# Patient Record
Sex: MALE | Race: BLACK OR AFRICAN AMERICAN | NOT HISPANIC OR LATINO | Employment: OTHER | URBAN - METROPOLITAN AREA
[De-identification: names, ages, dates, MRNs, and addresses within clinical notes are randomized per-mention and may not be internally consistent; named-entity substitution may affect disease eponyms.]

---

## 2017-04-06 PROBLEM — E78.00 HYPERCHOLESTEREMIA: Chronic | Status: ACTIVE | Noted: 2017-04-06

## 2017-04-06 PROBLEM — I10 ESSENTIAL HYPERTENSION: Chronic | Status: ACTIVE | Noted: 2017-04-06

## 2018-01-10 PROBLEM — Z12.11 COLON CANCER SCREENING: Status: ACTIVE | Noted: 2018-01-10

## 2018-01-10 PROBLEM — Z12.11 COLON CANCER SCREENING: Status: RESOLVED | Noted: 2018-01-10 | Resolved: 2018-01-10

## 2018-03-14 PROBLEM — K76.0 HEPATIC STEATOSIS: Status: ACTIVE | Noted: 2018-03-14

## 2018-06-28 PROBLEM — Z98.890 S/P COLONOSCOPY: Chronic | Status: ACTIVE | Noted: 2018-06-28

## 2018-07-10 ENCOUNTER — TELEPHONE (OUTPATIENT)
Dept: ORTHOPEDICS | Facility: CLINIC | Age: 68
End: 2018-07-10

## 2018-07-10 DIAGNOSIS — G89.29 CHRONIC PAIN OF LEFT KNEE: Primary | ICD-10-CM

## 2018-07-10 DIAGNOSIS — M25.562 CHRONIC PAIN OF LEFT KNEE: Primary | ICD-10-CM

## 2018-07-10 NOTE — TELEPHONE ENCOUNTER
Informed patient he needs to complete the ordered xrays prior to his appointment patient verbalized understanding and will have those completed.

## 2018-07-10 NOTE — TELEPHONE ENCOUNTER
Left Voic[facility] to return call. Xray orders are in patient needs to obtain these prior to appointment.

## 2018-07-19 ENCOUNTER — OFFICE VISIT (OUTPATIENT)
Dept: ORTHOPEDICS | Facility: CLINIC | Age: 68
End: 2018-07-19
Payer: MEDICARE

## 2018-07-19 VITALS
DIASTOLIC BLOOD PRESSURE: 88 MMHG | WEIGHT: 254.13 LBS | SYSTOLIC BLOOD PRESSURE: 132 MMHG | HEIGHT: 72 IN | BODY MASS INDEX: 34.42 KG/M2 | HEART RATE: 63 BPM

## 2018-07-19 DIAGNOSIS — M17.12 PRIMARY OSTEOARTHRITIS OF LEFT KNEE: Primary | ICD-10-CM

## 2018-07-19 PROCEDURE — 99999 PR PBB SHADOW E&M-EST. PATIENT-LVL III: CPT | Mod: PBBFAC,,, | Performed by: ORTHOPAEDIC SURGERY

## 2018-07-19 PROCEDURE — 99204 OFFICE O/P NEW MOD 45 MIN: CPT | Mod: 25,S$PBB,, | Performed by: ORTHOPAEDIC SURGERY

## 2018-07-19 PROCEDURE — 20610 DRAIN/INJ JOINT/BURSA W/O US: CPT | Mod: S$PBB,LT,, | Performed by: ORTHOPAEDIC SURGERY

## 2018-07-19 PROCEDURE — 99213 OFFICE O/P EST LOW 20 MIN: CPT | Mod: PBBFAC,PN | Performed by: ORTHOPAEDIC SURGERY

## 2018-07-19 PROCEDURE — 20610 DRAIN/INJ JOINT/BURSA W/O US: CPT | Mod: PBBFAC,PN | Performed by: ORTHOPAEDIC SURGERY

## 2018-07-19 RX ORDER — TRIAMCINOLONE ACETONIDE 40 MG/ML
40 INJECTION, SUSPENSION INTRA-ARTICULAR; INTRAMUSCULAR
Status: DISCONTINUED | OUTPATIENT
Start: 2018-07-19 | End: 2023-08-14

## 2018-07-19 RX ORDER — MELOXICAM 15 MG/1
15 TABLET ORAL DAILY
Qty: 30 TABLET | Refills: 2 | Status: SHIPPED | OUTPATIENT
Start: 2018-07-19 | End: 2018-08-31

## 2018-07-19 NOTE — PROGRESS NOTES
Subjective:      Patient ID: Ignacio Pelaez is a 68 y.o. male.    Chief Complaint: Swelling and Pain of the Left Knee    Ignacio Pelaez is a 68 year old male here with a 5 years history of left knee pain. The patient is a  retiree. There was not a history of trauma.  The pain is moderate The pain is located in the medial aspect of the knee. There is is radiation.  The pain radaites to the anterior lower leg.  There is not catching or locking.   The pain is described as achy. The patient has not had prior surgery. It is aggravated by sitting, standing, walking, upon awakening and with moderate activity.  It is not alleviated by rest. There is not numbness or tingling of the lower extremity.  There is not back pain.  He  has not tried medications or injections.  He does have difficulty getting in or out of a car, getting dressed, or going up or down stairs.  The patient does not use an assistive device.          Review of Systems   Constitution: Negative for chills and fever.   Cardiovascular: Negative for chest pain and syncope.   Respiratory: Negative for cough and shortness of breath.    Gastrointestinal: Negative for nausea and vomiting.   Neurological: Negative for brief paralysis and seizures.   Psychiatric/Behavioral: Negative for altered mental status and hallucinations.         Objective:            General    Constitutional: He is oriented to person, place, and time. He appears well-developed and well-nourished.   HENT:   Head: Normocephalic and atraumatic.   Eyes: Conjunctivae are normal.   Neck: Normal range of motion.   Cardiovascular: Intact distal pulses.    Pulmonary/Chest: Effort normal.   Neurological: He is alert and oriented to person, place, and time.   Psychiatric: He has a normal mood and affect. His behavior is normal. Judgment and thought content normal.     General Musculoskeletal Exam   Gait: normal and antalgic         Left Knee Exam     Inspection   Erythema: absent  Scars: absent  Swelling:  absent  Effusion: present  Deformity: deformity  Bruising: absent    Tenderness   The patient tender to palpation of the no tenderness and medial joint line.    Range of Motion   Extension: 5   Flexion: 120     Tests   Meniscus   Nico:  Medial - positive Lateral - negative  Stability Lachman: normal (-1 to 2mm) PCL-Posterior Drawer: normal (0 to 2mm)  MCL - Valgus: normal (0 to 2mm)  LCL - Varus: normal (0 to 2mm)  Patella   Patellar Grind: positive    Other   Sensation: normal    Muscle Strength   Left Lower Extremity   Hip Abduction: 5/5   Quadriceps:  5/5   Hamstrin/5     Vascular Exam       Left Pulses  Dorsalis Pedis:      2+  Posterior Tibial:      2+        Narrative     EXAMINATION:  XR KNEE AP LAT WITH SUNRISE LEFT    CLINICAL HISTORY:  Pain in left knee    TECHNIQUE:  Three views, weight bearing    COMPARISON:  Left knee x-ray 2014    FINDINGS:  There is severe narrowing of the medial joint space with calcifications noted about the medial joint edge.  There is no joint effusion.  The patella appears unremarkable.                 Assessment:       Encounter Diagnosis   Name Primary?    Primary osteoarthritis of left knee Yes          Plan:       Ignacio was seen today for swelling and pain.    Diagnoses and all orders for this visit:    Primary osteoarthritis of left knee  -     meloxicam (MOBIC) 15 MG tablet; Take 1 tablet (15 mg total) by mouth once daily. Do not take with any other NSAIDs  Take with a meal      67yo M with LEFT knee OA    Inject LEFT knee  NSAIDs  Activity as tolerated  RTC in 1 month    After obtaining verbal consent from the patient, the injection site was thoroughly prepped.  Ethyl chloride was administered to the skin and the needle was introduced into the LEFT knee.  The injection solution (40mg of Triamcinolone and 0.25% bupivicaine) was placed without complication into the expected location.  The injection site was dressed with a standard band-aid.  The  patient tolerated the injection without issue.

## 2018-08-16 ENCOUNTER — OFFICE VISIT (OUTPATIENT)
Dept: ORTHOPEDICS | Facility: CLINIC | Age: 68
End: 2018-08-16
Payer: MEDICARE

## 2018-08-16 VITALS
HEART RATE: 62 BPM | SYSTOLIC BLOOD PRESSURE: 127 MMHG | BODY MASS INDEX: 34.07 KG/M2 | DIASTOLIC BLOOD PRESSURE: 84 MMHG | WEIGHT: 251.19 LBS

## 2018-08-16 DIAGNOSIS — G89.29 CHRONIC PAIN OF LEFT KNEE: Primary | ICD-10-CM

## 2018-08-16 DIAGNOSIS — M25.562 CHRONIC PAIN OF LEFT KNEE: Primary | ICD-10-CM

## 2018-08-16 PROCEDURE — 99214 OFFICE O/P EST MOD 30 MIN: CPT | Mod: S$PBB,,, | Performed by: ORTHOPAEDIC SURGERY

## 2018-08-16 PROCEDURE — 99213 OFFICE O/P EST LOW 20 MIN: CPT | Mod: PBBFAC,PN | Performed by: ORTHOPAEDIC SURGERY

## 2018-08-16 PROCEDURE — 99999 PR PBB SHADOW E&M-EST. PATIENT-LVL III: CPT | Mod: PBBFAC,,, | Performed by: ORTHOPAEDIC SURGERY

## 2018-08-16 NOTE — PROGRESS NOTES
Subjective:      Patient ID: Ignacio Pelaez is a 68 y.o. male.    Chief Complaint: Pain and Follow-up of the Left Knee    Ignacio Pelaez is a 68 year old male here with a 5 years history of left knee pain. The patient is a  retiree. There was not a history of trauma.  The pain is moderate The pain is located in the medial aspect of the knee. There is is radiation.  The pain radaites to the anterior lower leg.  There is not catching or locking.   The pain is described as achy. The patient has not had prior surgery. It is aggravated by sitting, standing, walking, upon awakening and with moderate activity.  It is not alleviated by rest. There is not numbness or tingling of the lower extremity.  There is not back pain.  He  has not tried medications or injections.  He does have difficulty getting in or out of a car, getting dressed, or going up or down stairs.  The patient does not use an assistive device.  Returns today with good relief after injection      Pain   Pertinent negatives include no chest pain, chills, coughing, fever, nausea or vomiting.       Review of Systems   Constitution: Negative for chills and fever.   Cardiovascular: Negative for chest pain and syncope.   Respiratory: Negative for cough and shortness of breath.    Gastrointestinal: Negative for nausea and vomiting.   Neurological: Negative for brief paralysis and seizures.   Psychiatric/Behavioral: Negative for altered mental status and hallucinations.         Objective:            General    Constitutional: He is oriented to person, place, and time. He appears well-developed and well-nourished.   HENT:   Head: Normocephalic and atraumatic.   Eyes: Conjunctivae are normal.   Neck: Normal range of motion.   Cardiovascular: Intact distal pulses.    Pulmonary/Chest: Effort normal.   Neurological: He is alert and oriented to person, place, and time.   Psychiatric: He has a normal mood and affect. His behavior is normal. Judgment and thought content  normal.     General Musculoskeletal Exam   Gait: normal and antalgic         Left Knee Exam     Inspection   Erythema: absent  Scars: absent  Swelling: absent  Effusion: present  Deformity: deformity  Bruising: absent    Tenderness   The patient tender to palpation of the no tenderness and medial joint line.    Range of Motion   Extension: 5   Flexion: 120     Tests   Meniscus   Nico:  Medial - positive Lateral - negative  Stability Lachman: normal (-1 to 2mm) PCL-Posterior Drawer: normal (0 to 2mm)  MCL - Valgus: normal (0 to 2mm)  LCL - Varus: normal (0 to 2mm)  Patella   Patellar Grind: positive    Other   Sensation: normal    Muscle Strength   Left Lower Extremity   Hip Abduction: 5/5   Quadriceps:  5/5   Hamstrin/5     Vascular Exam       Left Pulses  Dorsalis Pedis:      2+  Posterior Tibial:      2+        Narrative     EXAMINATION:  XR KNEE AP LAT WITH SUNRISE LEFT    CLINICAL HISTORY:  Pain in left knee    TECHNIQUE:  Three views, weight bearing    COMPARISON:  Left knee x-ray 2014    FINDINGS:  There is severe narrowing of the medial joint space with calcifications noted about the medial joint edge.  There is no joint effusion.  The patella appears unremarkable.                 Assessment:       Encounter Diagnosis   Name Primary?    Chronic pain of left knee Yes          Plan:       Ignacio was seen today for pain and follow-up.    Diagnoses and all orders for this visit:    Chronic pain of left knee      69yo M with LEFT knee OA    NSAIDs  Activity as tolerated  RTC in 2 month

## 2018-10-25 ENCOUNTER — OFFICE VISIT (OUTPATIENT)
Dept: ORTHOPEDICS | Facility: CLINIC | Age: 68
End: 2018-10-25
Payer: MEDICARE

## 2018-10-25 VITALS
DIASTOLIC BLOOD PRESSURE: 83 MMHG | WEIGHT: 250.69 LBS | HEIGHT: 73 IN | BODY MASS INDEX: 33.22 KG/M2 | HEART RATE: 71 BPM | SYSTOLIC BLOOD PRESSURE: 117 MMHG

## 2018-10-25 DIAGNOSIS — M17.12 PRIMARY OSTEOARTHRITIS OF LEFT KNEE: Primary | ICD-10-CM

## 2018-10-25 PROCEDURE — 99214 OFFICE O/P EST MOD 30 MIN: CPT | Mod: S$PBB,25,, | Performed by: ORTHOPAEDIC SURGERY

## 2018-10-25 PROCEDURE — 20610 DRAIN/INJ JOINT/BURSA W/O US: CPT | Mod: PBBFAC,PN | Performed by: ORTHOPAEDIC SURGERY

## 2018-10-25 PROCEDURE — 99999 PR PBB SHADOW E&M-EST. PATIENT-LVL III: CPT | Mod: PBBFAC,,, | Performed by: ORTHOPAEDIC SURGERY

## 2018-10-25 PROCEDURE — 99213 OFFICE O/P EST LOW 20 MIN: CPT | Mod: PBBFAC,PN | Performed by: ORTHOPAEDIC SURGERY

## 2018-10-25 RX ORDER — TRIAMCINOLONE ACETONIDE 40 MG/ML
40 INJECTION, SUSPENSION INTRA-ARTICULAR; INTRAMUSCULAR
Status: DISCONTINUED | OUTPATIENT
Start: 2018-10-25 | End: 2018-10-25 | Stop reason: HOSPADM

## 2018-10-25 RX ADMIN — TRIAMCINOLONE ACETONIDE 40 MG: 40 INJECTION, SUSPENSION INTRA-ARTICULAR; INTRAMUSCULAR at 03:10

## 2018-10-25 NOTE — PROCEDURES
Large Joint Aspiration/Injection: L knee  Date/Time: 10/25/2018 3:51 PM  Performed by: Luis Miguel Chi MD  Authorized by: Luis Miguel Chi MD     Consent Done?:  Yes (Verbal)  Indications:  Pain  Procedure site marked: Yes    Timeout: Prior to procedure the correct patient, procedure, and site was verified      Location:  Knee  Site:  L knee  Prep: Patient was prepped and draped in usual sterile fashion    Ultrasonic Guidance for needle placement: No  Needle size:  21 G  Approach:  Anterolateral  Medications:  40 mg triamcinolone acetonide 40 mg/mL  Patient tolerance:  Patient tolerated the procedure well with no immediate complications

## 2018-10-25 NOTE — PROGRESS NOTES
Subjective:      Patient ID: Ignacio Pelaez Jr. is a 68 y.o. male.    Chief Complaint: Follow-up (2 month f/u)    Ignacio Pelaez is a 68 year old male here with a 5 years history of left knee pain. The patient is a  retiree. There was not a history of trauma.  The pain is moderate The pain is located in the medial aspect of the knee. There is is radiation.  The pain radaites to the anterior lower leg.  There is not catching or locking.   The pain is described as achy. The patient has not had prior surgery. It is aggravated by sitting, standing, walking, upon awakening and with moderate activity.  It is not alleviated by rest. There is not numbness or tingling of the lower extremity.  There is not back pain.  He  has not tried medications or injections.  He does have difficulty getting in or out of a car, getting dressed, or going up or down stairs.  The patient does not use an assistive device.          Review of Systems   Constitution: Negative for chills and fever.   Cardiovascular: Negative for chest pain and syncope.   Respiratory: Negative for cough and shortness of breath.    Gastrointestinal: Negative for nausea and vomiting.   Neurological: Negative for brief paralysis and seizures.   Psychiatric/Behavioral: Negative for altered mental status and hallucinations.         Objective:            General    Constitutional: He is oriented to person, place, and time. He appears well-developed and well-nourished.   HENT:   Head: Normocephalic and atraumatic.   Eyes: Conjunctivae are normal.   Neck: Normal range of motion.   Cardiovascular: Intact distal pulses.    Pulmonary/Chest: Effort normal.   Neurological: He is alert and oriented to person, place, and time.   Psychiatric: He has a normal mood and affect. His behavior is normal. Judgment and thought content normal.     General Musculoskeletal Exam   Gait: normal and antalgic         Left Knee Exam     Inspection   Erythema: absent  Scars: absent  Swelling:  absent  Effusion: present  Deformity: absent  Bruising: absent    Tenderness   The patient tender to palpation of the no tenderness and medial joint line.    Range of Motion   Extension: 5   Flexion: 120     Tests   Meniscus   Nico:  Medial - positive Lateral - negative  Stability Lachman: normal (-1 to 2mm) PCL-Posterior Drawer: normal (0 to 2mm)  MCL - Valgus: normal (0 to 2mm)  LCL - Varus: normal (0 to 2mm)  Patella   Patellar Grind: positive    Other   Sensation: normal    Muscle Strength   Left Lower Extremity   Hip Abduction: 5/5   Quadriceps:  5/5   Hamstrin/5     Vascular Exam       Left Pulses  Dorsalis Pedis:      2+  Posterior Tibial:      2+        Narrative     EXAMINATION:  XR KNEE AP LAT WITH SUNRISE LEFT    CLINICAL HISTORY:  Pain in left knee    TECHNIQUE:  Three views, weight bearing    COMPARISON:  Left knee x-ray 2014    FINDINGS:  There is severe narrowing of the medial joint space with calcifications noted about the medial joint edge.  There is no joint effusion.  The patella appears unremarkable.                 Assessment:       Encounter Diagnosis   Name Primary?    Primary osteoarthritis of left knee Yes          Plan:       Ignacio was seen today for follow-up.    Diagnoses and all orders for this visit:    Primary osteoarthritis of left knee  -     Large Joint Aspiration/Injection: L knee      67yo M with LEFT knee OA    Inject LEFT knee  NSAIDs  Activity as tolerated  RTC in 3 months

## 2018-12-13 PROBLEM — M20.10 HALLUX VALGUS: Status: ACTIVE | Noted: 2018-12-13

## 2018-12-13 PROBLEM — B35.1 ONYCHOMYCOSIS: Status: ACTIVE | Noted: 2018-12-13

## 2018-12-13 PROBLEM — M10.9 GOUT: Status: ACTIVE | Noted: 2018-12-13

## 2019-01-08 ENCOUNTER — TELEPHONE (OUTPATIENT)
Dept: ORTHOPEDICS | Facility: CLINIC | Age: 69
End: 2019-01-08

## 2019-01-24 ENCOUNTER — OFFICE VISIT (OUTPATIENT)
Dept: ORTHOPEDICS | Facility: CLINIC | Age: 69
End: 2019-01-24
Payer: MEDICARE

## 2019-01-24 VITALS
DIASTOLIC BLOOD PRESSURE: 97 MMHG | HEART RATE: 75 BPM | WEIGHT: 252.19 LBS | BODY MASS INDEX: 33.42 KG/M2 | HEIGHT: 73 IN | SYSTOLIC BLOOD PRESSURE: 153 MMHG

## 2019-01-24 DIAGNOSIS — M25.562 CHRONIC PAIN OF LEFT KNEE: Primary | ICD-10-CM

## 2019-01-24 DIAGNOSIS — G89.29 CHRONIC PAIN OF LEFT KNEE: Primary | ICD-10-CM

## 2019-01-24 PROCEDURE — 99999 PR PBB SHADOW E&M-EST. PATIENT-LVL III: CPT | Mod: PBBFAC,,, | Performed by: ORTHOPAEDIC SURGERY

## 2019-01-24 PROCEDURE — 99214 PR OFFICE/OUTPT VISIT, EST, LEVL IV, 30-39 MIN: ICD-10-PCS | Mod: S$PBB,,, | Performed by: ORTHOPAEDIC SURGERY

## 2019-01-24 PROCEDURE — 99214 OFFICE O/P EST MOD 30 MIN: CPT | Mod: S$PBB,,, | Performed by: ORTHOPAEDIC SURGERY

## 2019-01-24 PROCEDURE — 99213 OFFICE O/P EST LOW 20 MIN: CPT | Mod: PBBFAC,PN | Performed by: ORTHOPAEDIC SURGERY

## 2019-01-24 PROCEDURE — 99999 PR PBB SHADOW E&M-EST. PATIENT-LVL III: ICD-10-PCS | Mod: PBBFAC,,, | Performed by: ORTHOPAEDIC SURGERY

## 2019-01-24 NOTE — PROGRESS NOTES
Subjective:      Patient ID: Ignacio Pelaez Jr. is a 68 y.o. male.    Chief Complaint: Pain of the Left Knee    Ignacio Pelaez is a 68 year old male here with a 5 years history of left knee pain. The patient is a  retiree. There was not a history of trauma.  The pain is moderate The pain is located in the medial aspect of the knee. There is is radiation.  The pain radaites to the anterior lower leg.  There is not catching or locking.   The pain is described as achy. The patient has not had prior surgery. It is aggravated by sitting, standing, walking, upon awakening and with moderate activity.  It is not alleviated by rest. There is not numbness or tingling of the lower extremity.  There is not back pain.  He  has not tried medications or injections.  He does have difficulty getting in or out of a car, getting dressed, or going up or down stairs.  The patient does not use an assistive device.    Returns today with excellent relief since injection.        Pain   Pertinent negatives include no chest pain, chills, coughing, fever, nausea or vomiting.       Review of Systems   Constitution: Negative for chills and fever.   Cardiovascular: Negative for chest pain and syncope.   Respiratory: Negative for cough and shortness of breath.    Gastrointestinal: Negative for nausea and vomiting.   Neurological: Negative for brief paralysis and seizures.   Psychiatric/Behavioral: Negative for altered mental status and hallucinations.         Objective:            General    Constitutional: He is oriented to person, place, and time. He appears well-developed and well-nourished.   HENT:   Head: Normocephalic and atraumatic.   Eyes: Conjunctivae are normal.   Neck: Normal range of motion.   Cardiovascular: Intact distal pulses.    Pulmonary/Chest: Effort normal.   Neurological: He is alert and oriented to person, place, and time.   Psychiatric: He has a normal mood and affect. His behavior is normal. Judgment and thought content  normal.     General Musculoskeletal Exam   Gait: normal and antalgic         Left Knee Exam     Inspection   Erythema: absent  Scars: absent  Swelling: absent  Effusion: present  Deformity: absent  Bruising: absent    Tenderness   The patient tender to palpation of the no tenderness and medial joint line.    Range of Motion   Extension: 5   Flexion: 120     Tests   Meniscus   Nico:  Medial - positive Lateral - negative  Stability Lachman: normal (-1 to 2mm) PCL-Posterior Drawer: normal (0 to 2mm)  MCL - Valgus: normal (0 to 2mm)  LCL - Varus: normal (0 to 2mm)  Patella   Patellar Grind: positive    Other   Sensation: normal    Muscle Strength   Left Lower Extremity   Hip Abduction: 5/5   Quadriceps:  5/5   Hamstrin/5     Vascular Exam       Left Pulses  Dorsalis Pedis:      2+  Posterior Tibial:      2+        Narrative     EXAMINATION:  XR KNEE AP LAT WITH SUNRISE LEFT    CLINICAL HISTORY:  Pain in left knee    TECHNIQUE:  Three views, weight bearing    COMPARISON:  Left knee x-ray 2014    FINDINGS:  There is severe narrowing of the medial joint space with calcifications noted about the medial joint edge.  There is no joint effusion.  The patella appears unremarkable.                 Assessment:       Encounter Diagnosis   Name Primary?    Chronic pain of left knee Yes          Plan:       Ignacio was seen today for pain.    Diagnoses and all orders for this visit:    Chronic pain of left knee      67yo M with LEFT knee OA    COntinue current regimen  RTC 3 months

## 2019-03-25 PROBLEM — R10.84 ABDOMINAL PAIN, GENERALIZED: Status: ACTIVE | Noted: 2019-03-25

## 2019-04-25 ENCOUNTER — OFFICE VISIT (OUTPATIENT)
Dept: ORTHOPEDICS | Facility: CLINIC | Age: 69
End: 2019-04-25
Payer: MEDICARE

## 2019-04-25 VITALS
WEIGHT: 251.75 LBS | SYSTOLIC BLOOD PRESSURE: 149 MMHG | HEART RATE: 61 BPM | BODY MASS INDEX: 33.22 KG/M2 | DIASTOLIC BLOOD PRESSURE: 99 MMHG

## 2019-04-25 DIAGNOSIS — M17.12 PRIMARY OSTEOARTHRITIS OF LEFT KNEE: Primary | ICD-10-CM

## 2019-04-25 PROCEDURE — 20610 LARGE JOINT ASPIRATION/INJECTION: L KNEE: ICD-10-PCS | Mod: S$PBB,LT,, | Performed by: ORTHOPAEDIC SURGERY

## 2019-04-25 PROCEDURE — 99999 PR PBB SHADOW E&M-EST. PATIENT-LVL III: ICD-10-PCS | Mod: PBBFAC,,, | Performed by: ORTHOPAEDIC SURGERY

## 2019-04-25 PROCEDURE — 99214 OFFICE O/P EST MOD 30 MIN: CPT | Mod: S$PBB,25,, | Performed by: ORTHOPAEDIC SURGERY

## 2019-04-25 PROCEDURE — 99213 OFFICE O/P EST LOW 20 MIN: CPT | Mod: PBBFAC,PN,25 | Performed by: ORTHOPAEDIC SURGERY

## 2019-04-25 PROCEDURE — 99999 PR PBB SHADOW E&M-EST. PATIENT-LVL III: CPT | Mod: PBBFAC,,, | Performed by: ORTHOPAEDIC SURGERY

## 2019-04-25 PROCEDURE — 20610 DRAIN/INJ JOINT/BURSA W/O US: CPT | Mod: PBBFAC,PN | Performed by: ORTHOPAEDIC SURGERY

## 2019-04-25 PROCEDURE — 99214 PR OFFICE/OUTPT VISIT, EST, LEVL IV, 30-39 MIN: ICD-10-PCS | Mod: S$PBB,25,, | Performed by: ORTHOPAEDIC SURGERY

## 2019-04-25 RX ORDER — TRIAMCINOLONE ACETONIDE 40 MG/ML
40 INJECTION, SUSPENSION INTRA-ARTICULAR; INTRAMUSCULAR
Status: DISCONTINUED | OUTPATIENT
Start: 2019-04-25 | End: 2019-04-25 | Stop reason: HOSPADM

## 2019-04-25 RX ORDER — DICYCLOMINE HYDROCHLORIDE 20 MG/1
20 TABLET ORAL DAILY
Refills: 1 | COMMUNITY
Start: 2019-03-19 | End: 2021-05-19

## 2019-04-25 RX ADMIN — TRIAMCINOLONE ACETONIDE 40 MG: 40 INJECTION, SUSPENSION INTRA-ARTICULAR; INTRAMUSCULAR at 04:04

## 2019-04-25 NOTE — PROCEDURES
Large Joint Aspiration/Injection: L knee  Date/Time: 4/25/2019 4:01 PM  Performed by: Luis Miguel Chi MD  Authorized by: Luis Miguel Chi MD     Consent Done?:  Yes (Verbal)  Indications:  Pain  Procedure site marked: Yes    Timeout: Prior to procedure the correct patient, procedure, and site was verified      Location:  Knee  Site:  L knee  Prep: Patient was prepped and draped in usual sterile fashion    Ultrasonic Guidance for needle placement: No  Needle size:  21 G  Approach:  Anterolateral  Medications:  40 mg triamcinolone acetonide 40 mg/mL; 40 mg triamcinolone acetonide 40 mg/mL  Patient tolerance:  Patient tolerated the procedure well with no immediate complications

## 2019-04-25 NOTE — PROGRESS NOTES
Subjective:      Patient ID: Ignacio Pelaez Jr. is a 68 y.o. male.    Chief Complaint: Pain of the Left Knee and Follow-up    Ignacio Pelaez is a 68 year old male here with a 5 years history of left knee pain. The patient is a  retiree. There was not a history of trauma.  The pain is moderate The pain is located in the medial aspect of the knee. There is is radiation.  The pain radaites to the anterior lower leg.  There is not catching or locking.   The pain is described as achy. The patient has not had prior surgery. It is aggravated by sitting, standing, walking, upon awakening and with moderate activity.  It is not alleviated by rest. There is not numbness or tingling of the lower extremity.  There is not back pain.  He  has not tried medications or injections.  He does have difficulty getting in or out of a car, getting dressed, or going up or down stairs.  The patient does not use an assistive device.    Returns today with excellent relief since injection.      Pain   Pertinent negatives include no chest pain, chills, coughing, fever, nausea or vomiting.       Review of Systems   Constitution: Negative for chills and fever.   Cardiovascular: Negative for chest pain and syncope.   Respiratory: Negative for cough and shortness of breath.    Gastrointestinal: Negative for nausea and vomiting.   Neurological: Negative for brief paralysis and seizures.   Psychiatric/Behavioral: Negative for altered mental status and hallucinations.         Objective:            General    Constitutional: He is oriented to person, place, and time. He appears well-developed and well-nourished.   HENT:   Head: Normocephalic and atraumatic.   Eyes: Conjunctivae are normal.   Neck: Normal range of motion.   Cardiovascular: Intact distal pulses.    Pulmonary/Chest: Effort normal.   Neurological: He is alert and oriented to person, place, and time.   Psychiatric: He has a normal mood and affect. His behavior is normal. Judgment and thought  content normal.     General Musculoskeletal Exam   Gait: normal and antalgic         Left Knee Exam     Inspection   Erythema: absent  Scars: absent  Swelling: absent  Effusion: present  Deformity: absent  Bruising: absent    Tenderness   The patient tender to palpation of the no tenderness and medial joint line.    Range of Motion   Extension: 5   Flexion: 120     Tests   Meniscus   Nico:  Medial - positive Lateral - negative  Stability Lachman: normal (-1 to 2mm) PCL-Posterior Drawer: normal (0 to 2mm)  MCL - Valgus: normal (0 to 2mm)  LCL - Varus: normal (0 to 2mm)  Patella   Patellar Grind: positive    Other   Sensation: normal    Muscle Strength   Left Lower Extremity   Hip Abduction: 5/5   Quadriceps:  5/5   Hamstrin/5     Vascular Exam       Left Pulses  Dorsalis Pedis:      2+  Posterior Tibial:      2+          Radiographs of the Left knee demonstrate no fracture disclocation.  There is moderate/severe tricompartmental degeneration with varus deformity.              Assessment:       Encounter Diagnosis   Name Primary?    Primary osteoarthritis of left knee Yes          Plan:       Ignacio was seen today for follow-up and pain.    Diagnoses and all orders for this visit:    Primary osteoarthritis of left knee      67yo M with LEFT knee OA    Inject LEFT knee  NSAIDs  RTC 3 months

## 2019-07-08 PROBLEM — M47.812 SPONDYLOSIS OF CERVICAL REGION WITHOUT MYELOPATHY OR RADICULOPATHY: Status: ACTIVE | Noted: 2019-07-08

## 2019-08-15 ENCOUNTER — OFFICE VISIT (OUTPATIENT)
Dept: ORTHOPEDICS | Facility: CLINIC | Age: 69
End: 2019-08-15
Payer: MEDICARE

## 2019-08-15 VITALS — DIASTOLIC BLOOD PRESSURE: 80 MMHG | SYSTOLIC BLOOD PRESSURE: 110 MMHG | HEART RATE: 78 BPM

## 2019-08-15 DIAGNOSIS — G89.29 CHRONIC PAIN OF LEFT KNEE: Primary | ICD-10-CM

## 2019-08-15 DIAGNOSIS — M25.562 CHRONIC PAIN OF LEFT KNEE: Primary | ICD-10-CM

## 2019-08-15 PROCEDURE — 20610 DRAIN/INJ JOINT/BURSA W/O US: CPT | Mod: PBBFAC,PN | Performed by: ORTHOPAEDIC SURGERY

## 2019-08-15 PROCEDURE — 99213 OFFICE O/P EST LOW 20 MIN: CPT | Mod: PBBFAC,PN | Performed by: ORTHOPAEDIC SURGERY

## 2019-08-15 PROCEDURE — 99999 PR PBB SHADOW E&M-EST. PATIENT-LVL III: CPT | Mod: PBBFAC,,, | Performed by: ORTHOPAEDIC SURGERY

## 2019-08-15 PROCEDURE — 20610 LARGE JOINT ASPIRATION/INJECTION: L KNEE: ICD-10-PCS | Mod: S$PBB,LT,, | Performed by: ORTHOPAEDIC SURGERY

## 2019-08-15 PROCEDURE — 99999 PR PBB SHADOW E&M-EST. PATIENT-LVL III: ICD-10-PCS | Mod: PBBFAC,,, | Performed by: ORTHOPAEDIC SURGERY

## 2019-08-15 PROCEDURE — 99214 OFFICE O/P EST MOD 30 MIN: CPT | Mod: S$PBB,25,, | Performed by: ORTHOPAEDIC SURGERY

## 2019-08-15 PROCEDURE — 99214 PR OFFICE/OUTPT VISIT, EST, LEVL IV, 30-39 MIN: ICD-10-PCS | Mod: S$PBB,25,, | Performed by: ORTHOPAEDIC SURGERY

## 2019-08-15 RX ORDER — TRIAMCINOLONE ACETONIDE 40 MG/ML
40 INJECTION, SUSPENSION INTRA-ARTICULAR; INTRAMUSCULAR
Status: DISCONTINUED | OUTPATIENT
Start: 2019-08-15 | End: 2019-08-15 | Stop reason: HOSPADM

## 2019-08-15 RX ADMIN — TRIAMCINOLONE ACETONIDE 40 MG: 40 INJECTION, SUSPENSION INTRA-ARTICULAR; INTRAMUSCULAR at 02:08

## 2019-08-15 NOTE — PROCEDURES
Large Joint Aspiration/Injection: L knee  Date/Time: 8/15/2019 2:04 PM  Performed by: Luis Miguel Chi MD  Authorized by: Luis Miguel Chi MD     Consent Done?:  Yes (Verbal)  Indications:  Pain  Procedure site marked: Yes    Timeout: Prior to procedure the correct patient, procedure, and site was verified    Anesthesia  Local anesthesia used  Anesthetic: topical anesthetic    Location:  Knee  Site:  L knee  Prep: Patient was prepped and draped in usual sterile fashion    Needle size:  21 G  Ultrasonic Guidance for needle placement: No  Approach:  Anterolateral  Medications:  40 mg triamcinolone acetonide 40 mg/mL  Patient tolerance:  Patient tolerated the procedure well with no immediate complications

## 2019-08-15 NOTE — PROGRESS NOTES
Subjective:      Patient ID: Ignacio Pelaez Jr. is a 69 y.o. male.    Chief Complaint: Pain of the Left Knee and Follow-up    Ignacio Pelaez is a 68 year old male here with a 5 years history of left knee pain. The patient is a  retiree. There was not a history of trauma.  The pain is moderate The pain is located in the medial aspect of the knee. There is is radiation.  The pain radaites to the anterior lower leg.  There is not catching or locking.   The pain is described as achy. The patient has not had prior surgery. It is aggravated by sitting, standing, walking, upon awakening and with moderate activity.  It is not alleviated by rest. There is not numbness or tingling of the lower extremity.  There is not back pain.  He  has not tried medications or injections.  He does have difficulty getting in or out of a car, getting dressed, or going up or down stairs.  The patient does not use an assistive device.    Returns today with excellent relief since injection.  Desires reinjection    Pain   Pertinent negatives include no chest pain, chills, coughing, fever, nausea or vomiting.       Review of Systems   Constitution: Negative for chills and fever.   Cardiovascular: Negative for chest pain and syncope.   Respiratory: Negative for cough and shortness of breath.    Gastrointestinal: Negative for nausea and vomiting.   Neurological: Negative for brief paralysis and seizures.   Psychiatric/Behavioral: Negative for altered mental status and hallucinations.         Objective:            General    Constitutional: He is oriented to person, place, and time. He appears well-developed and well-nourished.   HENT:   Head: Normocephalic and atraumatic.   Eyes: Conjunctivae are normal.   Neck: Normal range of motion.   Cardiovascular: Intact distal pulses.    Pulmonary/Chest: Effort normal.   Neurological: He is alert and oriented to person, place, and time.   Psychiatric: He has a normal mood and affect. His behavior is normal.  Judgment and thought content normal.     General Musculoskeletal Exam   Gait: normal and antalgic         Left Knee Exam     Inspection   Erythema: absent  Scars: absent  Swelling: absent  Effusion: present  Deformity: absent  Bruising: absent    Tenderness   The patient tender to palpation of the no tenderness and medial joint line.    Range of Motion   Extension: 5   Flexion: 120     Tests   Meniscus   Nico:  Medial - positive Lateral - negative  Stability Lachman: normal (-1 to 2mm) PCL-Posterior Drawer: normal (0 to 2mm)  MCL - Valgus: normal (0 to 2mm)  LCL - Varus: normal (0 to 2mm)  Patella   Patellar Grind: positive    Other   Sensation: normal    Muscle Strength   Left Lower Extremity   Hip Abduction: 5/5   Quadriceps:  5/5   Hamstrin/5     Vascular Exam       Left Pulses  Dorsalis Pedis:      2+  Posterior Tibial:      2+          Radiographs of the Left knee demonstrate no fracture disclocation.  There is moderate/severe tricompartmental degeneration with varus deformity.              Assessment:       Encounter Diagnosis   Name Primary?    Chronic pain of left knee Yes          Plan:       Ignacio was seen today for follow-up and pain.    Diagnoses and all orders for this visit:    Chronic pain of left knee  -     Large Joint Aspiration/Injection: L knee      69yo M with LEFT knee OA    Inject LEFT knee  NSAIDs  RTC 3 months

## 2019-11-20 ENCOUNTER — TELEPHONE (OUTPATIENT)
Dept: ORTHOPEDICS | Facility: CLINIC | Age: 69
End: 2019-11-20

## 2019-11-20 NOTE — TELEPHONE ENCOUNTER
Called pt to confirm appointment for 11/21/2019. Pt verbalized understanding and confirmed appointment. No further information discussed.

## 2019-11-21 ENCOUNTER — OFFICE VISIT (OUTPATIENT)
Dept: ORTHOPEDICS | Facility: CLINIC | Age: 69
End: 2019-11-21
Payer: MEDICARE

## 2019-11-21 VITALS — HEIGHT: 73 IN | WEIGHT: 246.94 LBS | BODY MASS INDEX: 32.73 KG/M2

## 2019-11-21 DIAGNOSIS — M17.12 PRIMARY OSTEOARTHRITIS OF LEFT KNEE: Primary | ICD-10-CM

## 2019-11-21 PROCEDURE — 99999 PR PBB SHADOW E&M-EST. PATIENT-LVL III: CPT | Mod: PBBFAC,,, | Performed by: ORTHOPAEDIC SURGERY

## 2019-11-21 PROCEDURE — 99214 PR OFFICE/OUTPT VISIT, EST, LEVL IV, 30-39 MIN: ICD-10-PCS | Mod: S$PBB,25,, | Performed by: ORTHOPAEDIC SURGERY

## 2019-11-21 PROCEDURE — 99999 PR PBB SHADOW E&M-EST. PATIENT-LVL III: ICD-10-PCS | Mod: PBBFAC,,, | Performed by: ORTHOPAEDIC SURGERY

## 2019-11-21 PROCEDURE — 20610 DRAIN/INJ JOINT/BURSA W/O US: CPT | Mod: PBBFAC,PN | Performed by: ORTHOPAEDIC SURGERY

## 2019-11-21 PROCEDURE — 1159F MED LIST DOCD IN RCRD: CPT | Mod: ,,, | Performed by: ORTHOPAEDIC SURGERY

## 2019-11-21 PROCEDURE — 99213 OFFICE O/P EST LOW 20 MIN: CPT | Mod: PBBFAC,PN | Performed by: ORTHOPAEDIC SURGERY

## 2019-11-21 PROCEDURE — 20610 LARGE JOINT ASPIRATION/INJECTION: L KNEE: ICD-10-PCS | Mod: S$PBB,LT,, | Performed by: ORTHOPAEDIC SURGERY

## 2019-11-21 PROCEDURE — 1159F PR MEDICATION LIST DOCUMENTED IN MEDICAL RECORD: ICD-10-PCS | Mod: ,,, | Performed by: ORTHOPAEDIC SURGERY

## 2019-11-21 PROCEDURE — 99214 OFFICE O/P EST MOD 30 MIN: CPT | Mod: S$PBB,25,, | Performed by: ORTHOPAEDIC SURGERY

## 2019-11-21 PROCEDURE — 1125F AMNT PAIN NOTED PAIN PRSNT: CPT | Mod: ,,, | Performed by: ORTHOPAEDIC SURGERY

## 2019-11-21 PROCEDURE — 1125F PR PAIN SEVERITY QUANTIFIED, PAIN PRESENT: ICD-10-PCS | Mod: ,,, | Performed by: ORTHOPAEDIC SURGERY

## 2019-11-21 RX ORDER — TRIAMCINOLONE ACETONIDE 40 MG/ML
40 INJECTION, SUSPENSION INTRA-ARTICULAR; INTRAMUSCULAR
Status: DISCONTINUED | OUTPATIENT
Start: 2019-11-21 | End: 2019-11-21 | Stop reason: HOSPADM

## 2019-11-21 RX ADMIN — TRIAMCINOLONE ACETONIDE 40 MG: 40 INJECTION, SUSPENSION INTRA-ARTICULAR; INTRAMUSCULAR at 11:11

## 2019-11-21 NOTE — PROGRESS NOTES
Subjective:      Patient ID: Ignacio Pelaez Jr. is a 69 y.o. male.    Chief Complaint: Pain of the Left Knee    Ignacio Pelaez is a 68 year old male here with a 5 years history of left knee pain. The patient is a  retiree. There was not a history of trauma.  The pain is moderate The pain is located in the medial aspect of the knee. There is is radiation.  The pain radaites to the anterior lower leg.  There is not catching or locking.   The pain is described as achy. The patient has not had prior surgery. It is aggravated by sitting, standing, walking, upon awakening and with moderate activity.  It is not alleviated by rest. There is not numbness or tingling of the lower extremity.  There is not back pain.  He  has not tried medications or injections.  He does have difficulty getting in or out of a car, getting dressed, or going up or down stairs.  The patient does not use an assistive device.    Returns today with excellent relief since injection.  Desires reinjection    Pain   Pertinent negatives include no chest pain, chills, coughing, fever, nausea or vomiting.       Review of Systems   Constitution: Negative for chills and fever.   Cardiovascular: Negative for chest pain and syncope.   Respiratory: Negative for cough and shortness of breath.    Gastrointestinal: Negative for nausea and vomiting.   Neurological: Negative for brief paralysis and seizures.   Psychiatric/Behavioral: Negative for altered mental status and hallucinations.         Objective:            General    Constitutional: He is oriented to person, place, and time. He appears well-developed and well-nourished.   HENT:   Head: Normocephalic and atraumatic.   Eyes: Conjunctivae are normal.   Neck: Normal range of motion.   Cardiovascular: Intact distal pulses.    Pulmonary/Chest: Effort normal.   Neurological: He is alert and oriented to person, place, and time.   Psychiatric: He has a normal mood and affect. His behavior is normal. Judgment and  thought content normal.     General Musculoskeletal Exam   Gait: normal and antalgic         Left Knee Exam     Inspection   Erythema: absent  Scars: absent  Swelling: absent  Effusion: present  Deformity: absent  Bruising: absent    Tenderness   The patient tender to palpation of the no tenderness and medial joint line.    Range of Motion   Extension: 5   Flexion: 120     Tests   Meniscus   Nico:  Medial - positive Lateral - negative  Stability Lachman: normal (-1 to 2mm) PCL-Posterior Drawer: normal (0 to 2mm)  MCL - Valgus: normal (0 to 2mm)  LCL - Varus: normal (0 to 2mm)  Patella   Patellar Grind: positive    Other   Sensation: normal    Muscle Strength   Left Lower Extremity   Hip Abduction: 5/5   Quadriceps:  5/5   Hamstrin/5     Vascular Exam       Left Pulses  Dorsalis Pedis:      2+  Posterior Tibial:      2+          Radiographs of the Left knee demonstrate no fracture disclocation.  There is moderate/severe tricompartmental degeneration with varus deformity.              Assessment:       No diagnosis found.       Plan:       There are no diagnoses linked to this encounter.  69yo M with LEFT knee OA    Inject LEFT knee  NSAIDs  RTC 3 months

## 2019-11-21 NOTE — PROCEDURES
Large Joint Aspiration/Injection: L knee  Date/Time: 11/21/2019 11:30 AM  Performed by: Luis Miguel Chi MD  Authorized by: Luis Miguel Chi MD     Consent Done?:  Yes (Verbal)  Indications:  Pain  Procedure site marked: Yes    Timeout: Prior to procedure the correct patient, procedure, and site was verified    Anesthesia  Local anesthesia used  Anesthetic: topical anesthetic    Location:  Knee  Site:  L knee  Prep: Patient was prepped and draped in usual sterile fashion    Needle size:  21 G  Ultrasonic Guidance for needle placement: No  Approach:  Anterolateral  Medications:  40 mg triamcinolone acetonide 40 mg/mL  Patient tolerance:  Patient tolerated the procedure well with no immediate complications

## 2020-01-20 PROBLEM — E55.9 VITAMIN D DEFICIENCY: Chronic | Status: ACTIVE | Noted: 2020-01-20

## 2020-01-20 PROBLEM — R10.84 ABDOMINAL PAIN, GENERALIZED: Status: RESOLVED | Noted: 2019-03-25 | Resolved: 2020-01-20

## 2020-01-20 PROBLEM — R73.03 PRE-DIABETES: Chronic | Status: ACTIVE | Noted: 2020-01-20

## 2020-02-13 ENCOUNTER — OFFICE VISIT (OUTPATIENT)
Dept: ORTHOPEDICS | Facility: CLINIC | Age: 70
End: 2020-02-13
Payer: MEDICARE

## 2020-02-13 VITALS
HEART RATE: 62 BPM | BODY MASS INDEX: 33.03 KG/M2 | OXYGEN SATURATION: 97 % | WEIGHT: 250.31 LBS | SYSTOLIC BLOOD PRESSURE: 143 MMHG | DIASTOLIC BLOOD PRESSURE: 93 MMHG

## 2020-02-13 DIAGNOSIS — M17.12 LOCALIZED OSTEOARTHRITIS OF LEFT KNEE: Primary | ICD-10-CM

## 2020-02-13 PROCEDURE — 20610 DRAIN/INJ JOINT/BURSA W/O US: CPT | Mod: PBBFAC,PN | Performed by: ORTHOPAEDIC SURGERY

## 2020-02-13 PROCEDURE — 99214 OFFICE O/P EST MOD 30 MIN: CPT | Mod: S$PBB,25,, | Performed by: ORTHOPAEDIC SURGERY

## 2020-02-13 PROCEDURE — 20610 LARGE JOINT ASPIRATION/INJECTION: L KNEE: ICD-10-PCS | Mod: S$PBB,LT,, | Performed by: ORTHOPAEDIC SURGERY

## 2020-02-13 PROCEDURE — 99214 PR OFFICE/OUTPT VISIT, EST, LEVL IV, 30-39 MIN: ICD-10-PCS | Mod: S$PBB,25,, | Performed by: ORTHOPAEDIC SURGERY

## 2020-02-13 PROCEDURE — 99999 PR PBB SHADOW E&M-EST. PATIENT-LVL III: CPT | Mod: PBBFAC,,, | Performed by: ORTHOPAEDIC SURGERY

## 2020-02-13 PROCEDURE — 99213 OFFICE O/P EST LOW 20 MIN: CPT | Mod: PBBFAC,PN,25 | Performed by: ORTHOPAEDIC SURGERY

## 2020-02-13 PROCEDURE — 99999 PR PBB SHADOW E&M-EST. PATIENT-LVL III: ICD-10-PCS | Mod: PBBFAC,,, | Performed by: ORTHOPAEDIC SURGERY

## 2020-02-13 RX ORDER — TRIAMCINOLONE ACETONIDE 40 MG/ML
40 INJECTION, SUSPENSION INTRA-ARTICULAR; INTRAMUSCULAR
Status: DISCONTINUED | OUTPATIENT
Start: 2020-02-13 | End: 2020-02-13 | Stop reason: HOSPADM

## 2020-02-13 RX ADMIN — TRIAMCINOLONE ACETONIDE 40 MG: 40 INJECTION, SUSPENSION INTRA-ARTICULAR; INTRAMUSCULAR at 03:02

## 2020-02-13 NOTE — PROGRESS NOTES
Subjective:      Patient ID: Ignacio Pelaez Jr. is a 69 y.o. male.    Chief Complaint: Follow-up (left knee)    Ignacio Pelaez is a 68 year old male here with a 5 years history of left knee pain. The patient is a  retiree. There was not a history of trauma.  The pain is moderate The pain is located in the medial aspect of the knee. There is is radiation.  The pain radaites to the anterior lower leg.  There is not catching or locking.   The pain is described as achy. The patient has not had prior surgery. It is aggravated by sitting, standing, walking, upon awakening and with moderate activity.  It is not alleviated by rest. There is not numbness or tingling of the lower extremity.  There is not back pain.  He  has not tried medications or injections.  He does have difficulty getting in or out of a car, getting dressed, or going up or down stairs.  The patient does not use an assistive device.    Returns today with excellent relief since injection.  Desires reinjection    Pain   Pertinent negatives include no chest pain, chills, coughing, fever, nausea or vomiting.   Follow-up   Pertinent negatives include no chest pain, chills, coughing, fever, nausea or vomiting.       Review of Systems   Constitution: Negative for chills and fever.   Cardiovascular: Negative for chest pain and syncope.   Respiratory: Negative for cough and shortness of breath.    Gastrointestinal: Negative for nausea and vomiting.   Neurological: Negative for brief paralysis and seizures.   Psychiatric/Behavioral: Negative for altered mental status and hallucinations.         Objective:            General    Constitutional: He is oriented to person, place, and time. He appears well-developed and well-nourished.   HENT:   Head: Normocephalic and atraumatic.   Eyes: Conjunctivae are normal.   Neck: Normal range of motion.   Cardiovascular: Intact distal pulses.    Pulmonary/Chest: Effort normal.   Neurological: He is alert and oriented to person,  place, and time.   Psychiatric: He has a normal mood and affect. His behavior is normal. Judgment and thought content normal.     General Musculoskeletal Exam   Gait: normal and antalgic         Left Knee Exam     Inspection   Erythema: absent  Scars: absent  Swelling: absent  Effusion: present  Deformity: absent  Bruising: absent    Tenderness   The patient tender to palpation of the no tenderness and medial joint line.    Range of Motion   Extension: 5   Flexion: 120     Tests   Meniscus   Nico:  Medial - positive Lateral - negative  Stability Lachman: normal (-1 to 2mm) PCL-Posterior Drawer: normal (0 to 2mm)  MCL - Valgus: normal (0 to 2mm)  LCL - Varus: normal (0 to 2mm)  Patella   Patellar Grind: positive    Other   Sensation: normal    Muscle Strength   Left Lower Extremity   Hip Abduction: 5/5   Quadriceps:  5/5   Hamstrin/5     Vascular Exam       Left Pulses  Dorsalis Pedis:      2+  Posterior Tibial:      2+          Radiographs of the Left knee demonstrate no fracture disclocation.  There is moderate/severe tricompartmental degeneration with varus deformity.              Assessment:       Encounter Diagnosis   Name Primary?    Localized osteoarthritis of left knee Yes          Plan:       Ignacio was seen today for follow-up.    Diagnoses and all orders for this visit:    Localized osteoarthritis of left knee  -     Large Joint Aspiration/Injection: L knee      67yo M with LEFT knee OA    Inject LEFT knee  NSAIDs  RTC 3 months

## 2020-02-13 NOTE — PROCEDURES
Large Joint Aspiration/Injection: L knee  Performed by: Luis Miguel Chi MD  Authorized by: Luis Miguel Chi MD  Date/Time: 2/13/2020 3:15 PM    Consent Done?:  Yes (Verbal)  Indications:  pain    Anesthesia  Local anesthesia used  Anesthetic: topical anesthetic    Details:   Needle size: 21 G    Medications: 40 mg triamcinolone acetonide 40 mg/mL  Patient tolerance:  patient tolerated the procedure well with no immediate complications

## 2020-05-21 ENCOUNTER — TELEPHONE (OUTPATIENT)
Dept: ORTHOPEDICS | Facility: CLINIC | Age: 70
End: 2020-05-21

## 2020-05-21 NOTE — TELEPHONE ENCOUNTER
----- Message from Lory Palma sent at 5/21/2020 11:31 AM CDT -----  Contact: pt  Pt is returning call from nurse regardng rescheduling appt. Please give pt a call back at [phone] .

## 2020-05-21 NOTE — TELEPHONE ENCOUNTER
Returned patients call regarding appointment with orthopedics and to cancel his appointment per Dr. Chi. Appointment has been rescheduled with Dr. Iqbal in orthopedics. Patient aware of appointment date, time, and location. All questions answered; patient voiced understanding.

## 2020-05-21 NOTE — TELEPHONE ENCOUNTER
Reached out to patient regarding his appointment with Dr. Chi today in attempt to cancel his appointment per Dr. Chi. No answer; LVM to call the clinic back. Will try calling again.

## 2020-06-08 ENCOUNTER — TELEPHONE (OUTPATIENT)
Dept: ORTHOPEDICS | Facility: CLINIC | Age: 70
End: 2020-06-08

## 2020-06-08 NOTE — TELEPHONE ENCOUNTER
Called pt to confirm appointment for 06/09/2020 @930am. Pt did not answer the phone, VM was left, no further information given.

## 2020-06-09 ENCOUNTER — OFFICE VISIT (OUTPATIENT)
Dept: ORTHOPEDICS | Facility: CLINIC | Age: 70
End: 2020-06-09
Payer: MEDICARE

## 2020-06-09 VITALS
HEART RATE: 78 BPM | DIASTOLIC BLOOD PRESSURE: 92 MMHG | WEIGHT: 245.25 LBS | RESPIRATION RATE: 18 BRPM | OXYGEN SATURATION: 98 % | HEIGHT: 73 IN | SYSTOLIC BLOOD PRESSURE: 126 MMHG | BODY MASS INDEX: 32.5 KG/M2

## 2020-06-09 DIAGNOSIS — M17.12 PRIMARY OSTEOARTHRITIS OF LEFT KNEE: Primary | ICD-10-CM

## 2020-06-09 PROCEDURE — 99213 PR OFFICE/OUTPT VISIT, EST, LEVL III, 20-29 MIN: ICD-10-PCS | Mod: 25,S$PBB,, | Performed by: ORTHOPAEDIC SURGERY

## 2020-06-09 PROCEDURE — 99999 PR PBB SHADOW E&M-EST. PATIENT-LVL IV: ICD-10-PCS | Mod: PBBFAC,,, | Performed by: ORTHOPAEDIC SURGERY

## 2020-06-09 PROCEDURE — 99999 PR PBB SHADOW E&M-EST. PATIENT-LVL IV: CPT | Mod: PBBFAC,,, | Performed by: ORTHOPAEDIC SURGERY

## 2020-06-09 PROCEDURE — 20610 LARGE JOINT ASPIRATION/INJECTION: L KNEE: ICD-10-PCS | Mod: S$PBB,LT,, | Performed by: ORTHOPAEDIC SURGERY

## 2020-06-09 PROCEDURE — 20610 DRAIN/INJ JOINT/BURSA W/O US: CPT | Mod: PBBFAC,PN | Performed by: ORTHOPAEDIC SURGERY

## 2020-06-09 PROCEDURE — 99213 OFFICE O/P EST LOW 20 MIN: CPT | Mod: 25,S$PBB,, | Performed by: ORTHOPAEDIC SURGERY

## 2020-06-09 PROCEDURE — 99214 OFFICE O/P EST MOD 30 MIN: CPT | Mod: PBBFAC,PN,25 | Performed by: ORTHOPAEDIC SURGERY

## 2020-06-09 RX ORDER — TRIAMCINOLONE ACETONIDE 40 MG/ML
40 INJECTION, SUSPENSION INTRA-ARTICULAR; INTRAMUSCULAR
Status: DISCONTINUED | OUTPATIENT
Start: 2020-06-09 | End: 2020-06-09 | Stop reason: HOSPADM

## 2020-06-09 RX ORDER — DICLOFENAC SODIUM 75 MG/1
75 TABLET, DELAYED RELEASE ORAL 2 TIMES DAILY
Qty: 60 TABLET | Refills: 1 | Status: SHIPPED | OUTPATIENT
Start: 2020-06-09 | End: 2021-04-23 | Stop reason: SDUPTHER

## 2020-06-09 RX ADMIN — TRIAMCINOLONE ACETONIDE 40 MG: 40 INJECTION, SUSPENSION INTRA-ARTICULAR; INTRAMUSCULAR at 09:06

## 2020-06-09 NOTE — PROCEDURES
Large Joint Aspiration/Injection: L knee  Date/Time: 6/9/2020 9:30 AM  Performed by: Garcia Iqbal MD  Authorized by: Garcia Iqbal MD     Consent Done?:  Yes (Verbal)  Indications:  Pain  Timeout: prior to procedure the correct patient, procedure, and site was verified    Prep: patient was prepped and draped in usual sterile fashion      Local anesthesia used?: Yes    Local anesthetic:  Topical anesthetic    Details:  Needle Size:  22 G  Ultrasonic Guidance for needle placement?: No    Approach: superolateral   Location:  Knee  Site:  L knee  Medications:  40 mg triamcinolone acetonide 40 mg/mL  Patient tolerance:  Patient tolerated the procedure well with no immediate complications

## 2020-06-09 NOTE — PROGRESS NOTES
"Subjective:    Patient ID:  Ignacio Pelaez Jr. is a 69 y.o. y.o. male who presents for f/u visit for Pain of the Left Knee      Patient returns for follow-up for left knee pain secondary to osteoarthritis. Has been seen by Dr. Chi and patient's history is well-documented in Dr. Chi's previous office notes. He requests repeat cortisone injection left knee today.          Objective:     BP (!) 126/92   Pulse 78   Resp 18   Ht 6' 1" (1.854 m)   Wt 111.3 kg (245 lb 4.2 oz)   SpO2 98%   BMI 32.36 kg/m²     Ortho Exam     Left knee: varus deformity; patella stable; tender MJL; ROM: -5 to 110 flexion; 1+ varus laxity    Imaging:           Assessment & Plan:     1. Primary osteoarthritis of left knee      1. Left knee intra-articular cortisone injection administered as documented; post-injection instructions reviewed  2. Voltaren 75 mg po bid (discontinue Mobic)  3. PT  4. Follow -up in 3 months    "

## 2020-09-09 ENCOUNTER — OFFICE VISIT (OUTPATIENT)
Dept: ORTHOPEDICS | Facility: CLINIC | Age: 70
End: 2020-09-09
Payer: MEDICARE

## 2020-09-09 VITALS
BODY MASS INDEX: 32.16 KG/M2 | HEART RATE: 70 BPM | DIASTOLIC BLOOD PRESSURE: 85 MMHG | SYSTOLIC BLOOD PRESSURE: 129 MMHG | HEIGHT: 73 IN | WEIGHT: 242.63 LBS

## 2020-09-09 DIAGNOSIS — M17.12 PRIMARY OSTEOARTHRITIS OF LEFT KNEE: Primary | ICD-10-CM

## 2020-09-09 PROCEDURE — 99213 OFFICE O/P EST LOW 20 MIN: CPT | Mod: PBBFAC,PN | Performed by: ORTHOPAEDIC SURGERY

## 2020-09-09 PROCEDURE — 99213 OFFICE O/P EST LOW 20 MIN: CPT | Mod: S$PBB,,, | Performed by: ORTHOPAEDIC SURGERY

## 2020-09-09 PROCEDURE — 99213 PR OFFICE/OUTPT VISIT, EST, LEVL III, 20-29 MIN: ICD-10-PCS | Mod: S$PBB,,, | Performed by: ORTHOPAEDIC SURGERY

## 2020-09-09 PROCEDURE — 99999 PR PBB SHADOW E&M-EST. PATIENT-LVL III: ICD-10-PCS | Mod: PBBFAC,,, | Performed by: ORTHOPAEDIC SURGERY

## 2020-09-09 PROCEDURE — 99999 PR PBB SHADOW E&M-EST. PATIENT-LVL III: CPT | Mod: PBBFAC,,, | Performed by: ORTHOPAEDIC SURGERY

## 2020-09-09 RX ORDER — INFLUENZA A VIRUS A/MICHIGAN/45/2015 X-275 (H1N1) ANTIGEN (FORMALDEHYDE INACTIVATED), INFLUENZA A VIRUS A/SINGAPORE/INFIMH-16-0019/2016 IVR-186 (H3N2) ANTIGEN (FORMALDEHYDE INACTIVATED), INFLUENZA B VIRUS B/PHUKET/3073/2013 ANTIGEN (FORMALDEHYDE INACTIVATED), AND INFLUENZA B VIRUS B/MARYLAND/15/2016 BX-69A ANTIGEN (FORMALDEHYDE INACTIVATED) 60; 60; 60; 60 UG/.7ML; UG/.7ML; UG/.7ML; UG/.7ML
INJECTION, SUSPENSION INTRAMUSCULAR
COMMUNITY
Start: 2020-09-06

## 2020-09-09 NOTE — PROGRESS NOTES
"Subjective:    Patient ID:  Ignacio Pelaez Jr. is a 70 y.o. y.o. male who presents for f/u visit for Pain of the Left Knee      Patient returns for follow-up for left knee pain. Reports improved symptoms. Was recently discharged from PT.        Objective:     /85   Pulse 70   Ht 6' 1" (1.854 m)   Wt 110.1 kg (242 lb 9.9 oz)   BMI 32.01 kg/m²     Ortho Exam     Left knee: varus deformity; patella stable; tender MJL; ROM: -5 to 110 flexion; 1+ varus laxity      Assessment & Plan:     1. Primary osteoarthritis of left knee, symptomatically improved     1.  Emphasized importance of maintaining baseline home program knee conditioning exercises as outlined by PT  2.  Follow-up prn, consider future trial of viscosupplement injections left knee  "

## 2021-02-18 PROBLEM — K80.20 CALCULUS OF GALLBLADDER WITHOUT CHOLECYSTITIS: Chronic | Status: ACTIVE | Noted: 2021-02-18

## 2021-04-26 ENCOUNTER — PATIENT MESSAGE (OUTPATIENT)
Dept: RESEARCH | Facility: HOSPITAL | Age: 71
End: 2021-04-26

## 2021-11-23 ENCOUNTER — OFFICE VISIT (OUTPATIENT)
Dept: PAIN MEDICINE | Facility: CLINIC | Age: 71
End: 2021-11-23
Payer: MEDICARE

## 2021-11-23 VITALS
HEIGHT: 73 IN | SYSTOLIC BLOOD PRESSURE: 124 MMHG | WEIGHT: 247.13 LBS | BODY MASS INDEX: 32.75 KG/M2 | RESPIRATION RATE: 16 BRPM | OXYGEN SATURATION: 96 % | HEART RATE: 70 BPM | DIASTOLIC BLOOD PRESSURE: 80 MMHG

## 2021-11-23 DIAGNOSIS — M47.812 CERVICAL SPONDYLOSIS: ICD-10-CM

## 2021-11-23 DIAGNOSIS — M54.12 CERVICAL RADICULOPATHY: ICD-10-CM

## 2021-11-23 DIAGNOSIS — M50.30 DDD (DEGENERATIVE DISC DISEASE), CERVICAL: Primary | ICD-10-CM

## 2021-11-23 PROCEDURE — 99204 PR OFFICE/OUTPT VISIT, NEW, LEVL IV, 45-59 MIN: ICD-10-PCS | Mod: S$PBB,,, | Performed by: PAIN MEDICINE

## 2021-11-23 PROCEDURE — 99999 PR PBB SHADOW E&M-EST. PATIENT-LVL V: CPT | Mod: PBBFAC,,, | Performed by: PAIN MEDICINE

## 2021-11-23 PROCEDURE — 99999 PR PBB SHADOW E&M-EST. PATIENT-LVL V: ICD-10-PCS | Mod: PBBFAC,,, | Performed by: PAIN MEDICINE

## 2021-11-23 PROCEDURE — 99215 OFFICE O/P EST HI 40 MIN: CPT | Mod: PBBFAC,PN | Performed by: PAIN MEDICINE

## 2021-11-23 PROCEDURE — 99204 OFFICE O/P NEW MOD 45 MIN: CPT | Mod: S$PBB,,, | Performed by: PAIN MEDICINE

## 2021-11-23 RX ORDER — GABAPENTIN 300 MG/1
300 CAPSULE ORAL 3 TIMES DAILY
Qty: 90 CAPSULE | Refills: 5 | Status: SHIPPED | OUTPATIENT
Start: 2021-11-23 | End: 2022-03-31

## 2021-11-23 RX ORDER — MELOXICAM 15 MG/1
15 TABLET ORAL DAILY
Qty: 30 TABLET | Refills: 2 | Status: SHIPPED | OUTPATIENT
Start: 2021-11-23 | End: 2022-02-10 | Stop reason: SDUPTHER

## 2021-12-10 ENCOUNTER — IMMUNIZATION (OUTPATIENT)
Dept: PRIMARY CARE CLINIC | Facility: CLINIC | Age: 71
End: 2021-12-10
Payer: MEDICARE

## 2021-12-10 DIAGNOSIS — Z23 NEED FOR VACCINATION: Primary | ICD-10-CM

## 2021-12-10 PROCEDURE — 0004A COVID-19, MRNA, LNP-S, PF, 30 MCG/0.3 ML DOSE VACCINE: CPT | Mod: PBBFAC,PN

## 2022-01-18 ENCOUNTER — OFFICE VISIT (OUTPATIENT)
Dept: PAIN MEDICINE | Facility: CLINIC | Age: 72
End: 2022-01-18
Payer: MEDICARE

## 2022-01-18 VITALS
BODY MASS INDEX: 33.38 KG/M2 | OXYGEN SATURATION: 100 % | DIASTOLIC BLOOD PRESSURE: 87 MMHG | SYSTOLIC BLOOD PRESSURE: 135 MMHG | HEIGHT: 73 IN | HEART RATE: 67 BPM | WEIGHT: 251.88 LBS

## 2022-01-18 DIAGNOSIS — M47.816 LUMBAR SPONDYLOSIS: ICD-10-CM

## 2022-01-18 DIAGNOSIS — M50.30 DDD (DEGENERATIVE DISC DISEASE), CERVICAL: ICD-10-CM

## 2022-01-18 DIAGNOSIS — M54.12 CERVICAL RADICULOPATHY: Primary | ICD-10-CM

## 2022-01-18 DIAGNOSIS — M51.36 DDD (DEGENERATIVE DISC DISEASE), LUMBAR: ICD-10-CM

## 2022-01-18 PROBLEM — M51.369 DDD (DEGENERATIVE DISC DISEASE), LUMBAR: Status: ACTIVE | Noted: 2022-01-18

## 2022-01-18 PROCEDURE — 99214 OFFICE O/P EST MOD 30 MIN: CPT | Mod: PBBFAC,PN | Performed by: PAIN MEDICINE

## 2022-01-18 PROCEDURE — 99214 OFFICE O/P EST MOD 30 MIN: CPT | Mod: S$PBB,,, | Performed by: PAIN MEDICINE

## 2022-01-18 PROCEDURE — 99999 PR PBB SHADOW E&M-EST. PATIENT-LVL IV: ICD-10-PCS | Mod: PBBFAC,,, | Performed by: PAIN MEDICINE

## 2022-01-18 PROCEDURE — 99214 PR OFFICE/OUTPT VISIT, EST, LEVL IV, 30-39 MIN: ICD-10-PCS | Mod: S$PBB,,, | Performed by: PAIN MEDICINE

## 2022-01-18 PROCEDURE — 99999 PR PBB SHADOW E&M-EST. PATIENT-LVL IV: CPT | Mod: PBBFAC,,, | Performed by: PAIN MEDICINE

## 2022-01-18 NOTE — PROGRESS NOTES
Subjective:     Patient ID: Ignacio Pelaez Jr. is a 71 y.o. male    Chief Complaint: Muscle Pain (Pulled muscle in lower back)      Referred by: No ref. provider found      HPI:    Interval History (1/18/22):  He returns today for follow up.  He reports that physical therapy has been helpful for the neck pain.  He reports his neck pain is essentially resolved.  He continues to attend physical therapy and performs regular home exercise program.  He does state however that he is having acute low back pain.  This pain started less than 1 week ago.  States he might have pulled muscle.  He denies any pain radiating to the lower extremities.  He denies any associated numbness, tingling, weakness, bowel bladder dysfunction.      Initial Encounter (11/23/21):  Ignacio Pelaez Jr. is a 71 y.o. male who presents today with acute neck pain.  This pain started within the past month.  No specific inciting events or injuries noted.  At this time, the pain is mostly resolved but still slightly bothersome.  However the time of onset it was very severe and very limiting.  The pain is located the bilateral lower cervical paraspinal regions.  The pain radiated across the upper back did not radiate into the upper extremities.  He denies any associated numbness, tingling, weakness, bowel bladder dysfunction.  He was seen at the urgent care and given an intramuscular injection and started on meloxicam and gabapentin.  Together, these significantly improved his pain.  He still utilizes gabapentin and meloxicam as needed but fairly infrequently..   This pain is described in detail below.    Physical Therapy:  Yes.    Non-pharmacologic Treatment:  Rest helps         · TENS?  No    Pain Medications:         · Currently taking:  Gabapentin, meloxicam    · Has tried in the past:      · Has not tried:  Opioids, Tylenol, Muscle relaxants, TCAs, SNRIs, topical creams    Blood thinners:  None    Interventional Therapies:  None    Relevant  Surgeries:  None    Affecting sleep?  No    Affecting daily activities? no    Depressive symptoms? no          · SI/HI? No    Work status: Retired    Pain Scores:    Best:       6/10  Worst:     9/10  Usually:   8/10  Today:    7/10    Review of Systems   Constitutional: Negative for activity change, appetite change, chills, fatigue, fever and unexpected weight change.   HENT: Negative for hearing loss.    Eyes: Negative for visual disturbance.   Respiratory: Negative for chest tightness and shortness of breath.    Cardiovascular: Negative for chest pain.   Gastrointestinal: Negative for abdominal pain, constipation, diarrhea, nausea and vomiting.   Genitourinary: Negative for difficulty urinating.   Musculoskeletal: Positive for back pain and myalgias. Negative for gait problem, neck pain and neck stiffness.   Skin: Negative for rash.   Neurological: Negative for dizziness, weakness, light-headedness, numbness and headaches.   Psychiatric/Behavioral: Negative for hallucinations, sleep disturbance and suicidal ideas. The patient is not nervous/anxious.        Past Medical History:   Diagnosis Date    Allergy     Collapse of lung 1971    past history.     GERD (gastroesophageal reflux disease)     Gout attack     Hallux valgus (acquired), unspecified foot     Hyperlipidemia     Hypertension     Irregular heart rhythm     Knee pain, chronic        Past Surgical History:   Procedure Laterality Date    chest tube   1971    chest tube placement from collapsed lung     COLONOSCOPY N/A 1/10/2018    Procedure: COLONOSCOPY;  Surgeon: Buck Ferrer MD;  Location: [facility];  Service: Endoscopy;  Laterality: N/A;    ESOPHAGOGASTRODUODENOSCOPY N/A 3/25/2019    Procedure: EGD (ESOPHAGOGASTRODUODENOSCOPY);  Surgeon: Buck Ferrer MD;  Location: [facility];  Service: Endoscopy;  Laterality: N/A;       Social History     Socioeconomic History    Marital status:     Number of children: 1   Occupational  History    Occupation: retired from school board    Tobacco Use    Smoking status: Never Smoker    Smokeless tobacco: Never Used   Substance and Sexual Activity    Alcohol use: Yes     Alcohol/week: 1.0 standard drink     Types: 1 Cans of beer per week     Comment: six pack per day & more over the weekend     Drug use: No    Sexual activity: Yes     Partners: Female     Birth control/protection: None       Review of patient's allergies indicates:  No Known Allergies    Current Outpatient Medications on File Prior to Visit   Medication Sig Dispense Refill    amLODIPine (NORVASC) 10 MG tablet TAKE 1 TABLET BY MOUTH DAILY 30 tablet 0    colchicine (COLCRYS) 0.6 mg tablet Take 1 tablet (0.6 mg total) by mouth once daily. 30 tablet 0    ergocalciferol (ERGOCALCIFEROL) 50,000 unit Cap Take 1 capsule (50,000 Units total) by mouth every 7 days. 12 capsule 0    FLUZONE HIGHDOSE QUAD 20-21  mcg/0.7 mL Syrg ADM 0.7ML IM UTD      gabapentin (NEURONTIN) 300 MG capsule Take 1 capsule (300 mg total) by mouth 3 (three) times daily. Start once daily, then BID on day 2,  then TID, prn on day 3 90 capsule 5    lisinopriL (PRINIVIL,ZESTRIL) 5 MG tablet TAKE 1 TABLET(5 MG) BY MOUTH EVERY DAY 90 tablet 0    lisinopriL 10 MG tablet Take 1 tablet (10 mg total) by mouth once daily. 90 tablet 0    meloxicam (MOBIC) 15 MG tablet Take 1 tablet (15 mg total) by mouth once daily. 30 tablet 2    pantoprazole (PROTONIX) 40 MG tablet Take 1 tablet (40 mg total) by mouth once daily. 90 tablet 0    simvastatin (ZOCOR) 10 MG tablet TAKE 1 TABLET(10 MG) BY MOUTH EVERY EVENING 90 tablet 0     Current Facility-Administered Medications on File Prior to Visit   Medication Dose Route Frequency Provider Last Rate Last Admin    triamcinolone acetonide injection 40 mg  40 mg Intra-articular 1 time in Clinic/HOD Luis Miguel Chi MD           Objective:      /87 (BP Location: Left arm, Patient Position: Sitting, BP Method:  "Large (Automatic))   Pulse 67   Ht 6' 1" (1.854 m)   Wt 114.3 kg (251 lb 14 oz)   SpO2 100%   BMI 33.23 kg/m²     Exam:  GEN:  Well developed, well nourished.  No acute distress.   HEENT:  No trauma.  Mucous membranes moist.  Nares patent bilaterally.  PSYCH: Normal affect. Thought content appropriate.  CHEST:  Breathing symmetric.  No audible wheezing.  ABD: Soft, non-distended.  SKIN:  Warm, pink, dry.  No rash on exposed areas.    EXT:  No cyanosis, clubbing, or edema.  No color change or changes in nail or hair growth.  NEURO/MUSCULOSKELETAL:  Fully alert, oriented, and appropriate. Speech normal milvia. No cranial nerve deficits.   Gait:  Normal.  No focal motor deficits.           Imaging:    Narrative & Impression    EXAMINATION:  XR CERVICAL SPINE COMPLETE 5 VIEW     CLINICAL HISTORY:  . Cervicalgia     TECHNIQUE:  AP, Lateral, bilateral oblique and open mouth views of the cervical spine were performed.     COMPARISON:  06/25/2019     FINDINGS:  Cervical vertebral body heights and alignment are preserved with multilevel spondylitic spurring and disc narrowing at the mid and lower cervical levels.  There is straightening and reversal of the normal cervical lordosis.     Posterior elements and neural foramina intact.     No prevertebral soft tissue swelling     Impression:     Multilevel spondylosis        Electronically signed by: Buck Young Jr  Date:                                            09/10/2021  Time:                                           10:52         Assessment:       Encounter Diagnoses   Name Primary?    Cervical radiculopathy Yes    DDD (degenerative disc disease), cervical     DDD (degenerative disc disease), lumbar     Lumbar spondylosis          Plan:       Ignacio was seen today for muscle pain.    Diagnoses and all orders for this visit:    Cervical radiculopathy    DDD (degenerative disc disease), cervical    DDD (degenerative disc disease), lumbar  -     Ambulatory " referral/consult to Physical/Occupational Therapy; Future  -     X-Ray Lumbar Spine Ap Lateral w/Flex Ext; Future    Lumbar spondylosis  -     Ambulatory referral/consult to Physical/Occupational Therapy; Future  -     X-Ray Lumbar Spine Ap Lateral w/Flex Ext; Future        Ignacio Pelaez Jr. is a 71 y.o. male with resolved acute neck pain.  Likely related to acute disc abnormality.  Symptoms have essentially resolved with known overt neurologic deficits noted.  Cervical x-rays do show significant multilevel degenerative disc disease.  Now also with acute low back pain.  Not having any radicular or lower extremity symptoms.  Likely from acute disc abnormality.    1.  Lumbar x-rays with flexion extension to get baseline imaging to rule out instability/fracture.  2.  Refer to PT for lumbar ROM, strengthening, stretching and HEP.  3.  Continue meloxicam as needed for pain.  4.  Return to clinic in 6 weeks or sooner if needed.  At that time we will discuss efficacy of physical therapy/home exercise program and meloxicam.  May consider lumbar MRI if pain persists or worsens.           This note was created by combination of typed  and M-Modal dictation. Transcription and phonetic errors may be present.  If there are any questions, please contact me.

## 2022-03-03 ENCOUNTER — OFFICE VISIT (OUTPATIENT)
Dept: PAIN MEDICINE | Facility: CLINIC | Age: 72
End: 2022-03-03
Payer: MEDICARE

## 2022-03-03 VITALS
HEART RATE: 69 BPM | SYSTOLIC BLOOD PRESSURE: 141 MMHG | HEIGHT: 73 IN | WEIGHT: 246.25 LBS | OXYGEN SATURATION: 95 % | DIASTOLIC BLOOD PRESSURE: 90 MMHG | BODY MASS INDEX: 32.64 KG/M2

## 2022-03-03 DIAGNOSIS — M54.12 CERVICAL RADICULOPATHY: ICD-10-CM

## 2022-03-03 DIAGNOSIS — M47.816 LUMBAR SPONDYLOSIS: ICD-10-CM

## 2022-03-03 DIAGNOSIS — M51.36 DDD (DEGENERATIVE DISC DISEASE), LUMBAR: ICD-10-CM

## 2022-03-03 DIAGNOSIS — M50.30 DDD (DEGENERATIVE DISC DISEASE), CERVICAL: Primary | ICD-10-CM

## 2022-03-03 PROCEDURE — 99213 OFFICE O/P EST LOW 20 MIN: CPT | Mod: PBBFAC,PN | Performed by: PAIN MEDICINE

## 2022-03-03 PROCEDURE — 99214 PR OFFICE/OUTPT VISIT, EST, LEVL IV, 30-39 MIN: ICD-10-PCS | Mod: S$PBB,,, | Performed by: PAIN MEDICINE

## 2022-03-03 PROCEDURE — 99214 OFFICE O/P EST MOD 30 MIN: CPT | Mod: S$PBB,,, | Performed by: PAIN MEDICINE

## 2022-03-03 PROCEDURE — 99999 PR PBB SHADOW E&M-EST. PATIENT-LVL III: ICD-10-PCS | Mod: PBBFAC,,, | Performed by: PAIN MEDICINE

## 2022-03-03 PROCEDURE — 99999 PR PBB SHADOW E&M-EST. PATIENT-LVL III: CPT | Mod: PBBFAC,,, | Performed by: PAIN MEDICINE

## 2022-03-03 NOTE — PROGRESS NOTES
Subjective:     Patient ID: Ignacio Pelaez Jr. is a 71 y.o. male    Chief Complaint: Neck Pain (F/U post PT & HEP)      Referred by: No ref. provider found      HPI:    Interval History (3/3/22):  He returns today for follow up.  He reports that he is doing well.  States that his back pain has mostly resolved since last encounter.  Still has some pain when he over exerts himself but this is fairly mild.  He also reports that his neck pain continues to be resolved.  He does not feel that further treatment is needed at this time.  He does continue to take gabapentin on an as-needed basis finds this is helpful when he is in pain.      Interval History (1/18/22):  He returns today for follow up.  He reports that physical therapy has been helpful for the neck pain.  He reports his neck pain is essentially resolved.  He continues to attend physical therapy and performs regular home exercise program.  He does state however that he is having acute low back pain.  This pain started less than 1 week ago.  States he might have pulled muscle.  He denies any pain radiating to the lower extremities.  He denies any associated numbness, tingling, weakness, bowel bladder dysfunction.      Initial Encounter (11/23/21):  Ignacio Pelaez Jr. is a 71 y.o. male who presents today with acute neck pain.  This pain started within the past month.  No specific inciting events or injuries noted.  At this time, the pain is mostly resolved but still slightly bothersome.  However the time of onset it was very severe and very limiting.  The pain is located the bilateral lower cervical paraspinal regions.  The pain radiated across the upper back did not radiate into the upper extremities.  He denies any associated numbness, tingling, weakness, bowel bladder dysfunction.  He was seen at the urgent care and given an intramuscular injection and started on meloxicam and gabapentin.  Together, these significantly improved his pain.  He still utilizes  gabapentin and meloxicam as needed but fairly infrequently..   This pain is described in detail below.    Physical Therapy:  Yes.    Non-pharmacologic Treatment:  Rest helps         · TENS?  No    Pain Medications:         · Currently taking:  Gabapentin, meloxicam    · Has tried in the past:      · Has not tried:  Opioids, Tylenol, Muscle relaxants, TCAs, SNRIs, topical creams    Blood thinners:  None    Interventional Therapies:  None    Relevant Surgeries:  None    Affecting sleep?  No    Affecting daily activities? no    Depressive symptoms? no          · SI/HI? No    Work status: Retired    Pain Scores:    Best:       0/10  Worst:     3/10  Usually:   1/10  Today:    0/10    Review of Systems   Constitutional: Negative for activity change, appetite change, chills, fatigue, fever and unexpected weight change.   HENT: Negative for hearing loss.    Eyes: Negative for visual disturbance.   Respiratory: Negative for chest tightness and shortness of breath.    Cardiovascular: Negative for chest pain.   Gastrointestinal: Negative for abdominal pain, constipation, diarrhea, nausea and vomiting.   Genitourinary: Negative for difficulty urinating.   Musculoskeletal: Positive for back pain and myalgias. Negative for gait problem, neck pain and neck stiffness.   Skin: Negative for rash.   Neurological: Negative for dizziness, weakness, light-headedness, numbness and headaches.   Psychiatric/Behavioral: Negative for hallucinations, sleep disturbance and suicidal ideas. The patient is not nervous/anxious.        Past Medical History:   Diagnosis Date    Allergy     Collapse of lung 1971    past history.     GERD (gastroesophageal reflux disease)     Gout attack     Hallux valgus (acquired), unspecified foot     Hyperlipidemia     Hypertension     Irregular heart rhythm     Knee pain, chronic        Past Surgical History:   Procedure Laterality Date    chest tube   1971    chest tube placement from collapsed lung      COLONOSCOPY N/A 1/10/2018    Procedure: COLONOSCOPY;  Surgeon: Buck Ferrer MD;  Location: [facility] ENDO;  Service: Endoscopy;  Laterality: N/A;    ESOPHAGOGASTRODUODENOSCOPY N/A 3/25/2019    Procedure: EGD (ESOPHAGOGASTRODUODENOSCOPY);  Surgeon: Buck Ferrer MD;  Location: [facility] ENDO;  Service: Endoscopy;  Laterality: N/A;       Social History     Socioeconomic History    Marital status:     Number of children: 1   Occupational History    Occupation: retired from school board    Tobacco Use    Smoking status: Never Smoker    Smokeless tobacco: Never Used   Substance and Sexual Activity    Alcohol use: Yes     Alcohol/week: 1.0 standard drink     Types: 1 Cans of beer per week     Comment: six pack per day & more over the weekend     Drug use: No    Sexual activity: Yes     Partners: Female     Birth control/protection: None       Review of patient's allergies indicates:  No Known Allergies    Current Outpatient Medications on File Prior to Visit   Medication Sig Dispense Refill    amLODIPine (NORVASC) 10 MG tablet Take 1 tablet (10 mg total) by mouth once daily. 30 tablet 0    cetirizine (ZYRTEC) 10 MG tablet Take 1 tablet (10 mg total) by mouth once daily. 15 tablet 0    clotrimazole (LOTRIMIN) 1 % cream Apply to affected area 2 times daily 30 g 1    ergocalciferol (ERGOCALCIFEROL) 50,000 unit Cap Take 1 capsule (50,000 Units total) by mouth every 30 days. 12 capsule 0    FLUZONE HIGHDOSE QUAD 20-21  mcg/0.7 mL Syrg ADM 0.7ML IM UTD      gabapentin (NEURONTIN) 300 MG capsule Take 1 capsule (300 mg total) by mouth 3 (three) times daily. Start once daily, then BID on day 2,  then TID, prn on day 3 90 capsule 5    lisinopriL 10 MG tablet Take 1 tablet (10 mg total) by mouth once daily. 90 tablet 0    meloxicam (MOBIC) 15 MG tablet Take 1 tablet (15 mg total) by mouth once daily. 30 tablet 2    pantoprazole (PROTONIX) 40 MG tablet Take 1 tablet (40 mg total) by mouth once daily. 90  "tablet 0    simvastatin (ZOCOR) 10 MG tablet Take 1 tablet (10 mg total) by mouth every evening. 90 tablet 0     Current Facility-Administered Medications on File Prior to Visit   Medication Dose Route Frequency Provider Last Rate Last Admin    triamcinolone acetonide injection 40 mg  40 mg Intra-articular 1 time in Clinic/HOD Luis Miguel Chi MD           Objective:      BP (!) 141/90 (BP Location: Left arm, Patient Position: Sitting, BP Method: Large (Automatic))   Pulse 69   Ht 6' 1" (1.854 m)   Wt 111.7 kg (246 lb 4.1 oz)   SpO2 95%   BMI 32.49 kg/m²     Exam:  GEN:  Well developed, well nourished.  No acute distress.   HEENT:  No trauma.  Mucous membranes moist.  Nares patent bilaterally.  PSYCH: Normal affect. Thought content appropriate.  CHEST:  Breathing symmetric.  No audible wheezing.  ABD: Soft, non-distended.  SKIN:  Warm, pink, dry.  No rash on exposed areas.    EXT:  No cyanosis, clubbing, or edema.  No color change or changes in nail or hair growth.  NEURO/MUSCULOSKELETAL:  Fully alert, oriented, and appropriate. Speech normal milvia. No cranial nerve deficits.   Gait:  Normal.  No focal motor deficits.           Imaging:    Narrative & Impression    EXAMINATION:  XR CERVICAL SPINE COMPLETE 5 VIEW     CLINICAL HISTORY:  . Cervicalgia     TECHNIQUE:  AP, Lateral, bilateral oblique and open mouth views of the cervical spine were performed.     COMPARISON:  06/25/2019     FINDINGS:  Cervical vertebral body heights and alignment are preserved with multilevel spondylitic spurring and disc narrowing at the mid and lower cervical levels.  There is straightening and reversal of the normal cervical lordosis.     Posterior elements and neural foramina intact.     No prevertebral soft tissue swelling     Impression:     Multilevel spondylosis        Electronically signed by: Buck Young Jr  Date:                                            09/10/2021  Time:                                    "        10:52         Assessment:       Encounter Diagnoses   Name Primary?    DDD (degenerative disc disease), cervical Yes    Cervical radiculopathy     DDD (degenerative disc disease), lumbar     Lumbar spondylosis          Plan:       Ignacio was seen today for neck pain.    Diagnoses and all orders for this visit:    DDD (degenerative disc disease), cervical    Cervical radiculopathy    DDD (degenerative disc disease), lumbar    Lumbar spondylosis        Ignacio Pelaez Jr. is a 71 y.o. male with resolved acute neck pain and low back pain.  Likely related to acute disc abnormality.  Symptoms have essentially resolved with known overt neurologic deficits noted.  Cervical x-rays do show significant multilevel degenerative disc disease.      1.  Pertinent imaging studies reviewed by me. Imaging results were discussed with patient.  2.  Continue gabapentin as needed for pain.  Patient call for refills if he runs out.  3.  Return to clinic as needed.           This note was created by combination of typed  and M-Modal dictation. Transcription and phonetic errors may be present.  If there are any questions, please contact me.

## 2022-03-31 ENCOUNTER — OFFICE VISIT (OUTPATIENT)
Dept: PAIN MEDICINE | Facility: CLINIC | Age: 72
End: 2022-03-31
Payer: MEDICARE

## 2022-03-31 VITALS
WEIGHT: 253.19 LBS | OXYGEN SATURATION: 96 % | HEIGHT: 72 IN | DIASTOLIC BLOOD PRESSURE: 84 MMHG | BODY MASS INDEX: 34.29 KG/M2 | HEART RATE: 64 BPM | RESPIRATION RATE: 16 BRPM | SYSTOLIC BLOOD PRESSURE: 133 MMHG

## 2022-03-31 DIAGNOSIS — M54.12 CERVICAL RADICULOPATHY: ICD-10-CM

## 2022-03-31 DIAGNOSIS — M50.30 DDD (DEGENERATIVE DISC DISEASE), CERVICAL: Primary | ICD-10-CM

## 2022-03-31 PROCEDURE — 99999 PR PBB SHADOW E&M-EST. PATIENT-LVL IV: CPT | Mod: PBBFAC,,, | Performed by: PAIN MEDICINE

## 2022-03-31 PROCEDURE — 99214 PR OFFICE/OUTPT VISIT, EST, LEVL IV, 30-39 MIN: ICD-10-PCS | Mod: S$PBB,,, | Performed by: PAIN MEDICINE

## 2022-03-31 PROCEDURE — 99999 PR PBB SHADOW E&M-EST. PATIENT-LVL IV: ICD-10-PCS | Mod: PBBFAC,,, | Performed by: PAIN MEDICINE

## 2022-03-31 PROCEDURE — 99214 OFFICE O/P EST MOD 30 MIN: CPT | Mod: PBBFAC,PN | Performed by: PAIN MEDICINE

## 2022-03-31 PROCEDURE — 99214 OFFICE O/P EST MOD 30 MIN: CPT | Mod: S$PBB,,, | Performed by: PAIN MEDICINE

## 2022-03-31 RX ORDER — GABAPENTIN 300 MG/1
600 CAPSULE ORAL 3 TIMES DAILY
Qty: 180 CAPSULE | Refills: 5 | Status: SHIPPED | OUTPATIENT
Start: 2022-03-31 | End: 2022-10-04

## 2022-03-31 NOTE — PROGRESS NOTES
Subjective:     Patient ID: Ignacio Pelaez Jr. is a 71 y.o. male    Chief Complaint: Shoulder Pain      Referred by: No ref. provider found      HPI:    Interval History (3/31/22):  He returns today for follow up.  He reports that he is having acute exacerbation of left-sided neck pain.  This started roughly 5 days ago.  No specific inciting events or injuries noted.  The pain is located left lower cervical paraspinal region left upper trapezius.  The pain did not radiate to left upper extremity.  He denies any associated numbness, tingling, weakness, bowel bladder dysfunction.  He went to the emergency department yesterday and received injection for his pain.  He states this significantly helped his pain.  He is also taking gabapentin with some relief.  He denies any adverse effects this medication.        Interval History (3/3/22):  He returns today for follow up.  He reports that he is doing well.  States that his back pain has mostly resolved since last encounter.  Still has some pain when he over exerts himself but this is fairly mild.  He also reports that his neck pain continues to be resolved.  He does not feel that further treatment is needed at this time.  He does continue to take gabapentin on an as-needed basis finds this is helpful when he is in pain.      Interval History (1/18/22):  He returns today for follow up.  He reports that physical therapy has been helpful for the neck pain.  He reports his neck pain is essentially resolved.  He continues to attend physical therapy and performs regular home exercise program.  He does state however that he is having acute low back pain.  This pain started less than 1 week ago.  States he might have pulled muscle.  He denies any pain radiating to the lower extremities.  He denies any associated numbness, tingling, weakness, bowel bladder dysfunction.      Initial Encounter (11/23/21):  Ignacio Pelaez Jr. is a 71 y.o. male who presents today with acute neck pain.   This pain started within the past month.  No specific inciting events or injuries noted.  At this time, the pain is mostly resolved but still slightly bothersome.  However the time of onset it was very severe and very limiting.  The pain is located the bilateral lower cervical paraspinal regions.  The pain radiated across the upper back did not radiate into the upper extremities.  He denies any associated numbness, tingling, weakness, bowel bladder dysfunction.  He was seen at the urgent care and given an intramuscular injection and started on meloxicam and gabapentin.  Together, these significantly improved his pain.  He still utilizes gabapentin and meloxicam as needed but fairly infrequently..   This pain is described in detail below.    Physical Therapy:  Yes.    Non-pharmacologic Treatment:  Rest helps         · TENS?  No    Pain Medications:         · Currently taking:  Gabapentin, meloxicam    · Has tried in the past:      · Has not tried:  Opioids, Tylenol, Muscle relaxants, TCAs, SNRIs, topical creams    Blood thinners:  None    Interventional Therapies:  None    Relevant Surgeries:  None    Affecting sleep?  No    Affecting daily activities? no    Depressive symptoms? no          · SI/HI? No    Work status: Retired    Pain Scores:    Best:       0/10  Worst:     3/10  Usually:   1/10  Today:    0/10    Review of Systems   Constitutional: Negative for activity change, appetite change, chills, fatigue, fever and unexpected weight change.   HENT: Negative for hearing loss.    Eyes: Negative for visual disturbance.   Respiratory: Negative for chest tightness and shortness of breath.    Cardiovascular: Negative for chest pain.   Gastrointestinal: Negative for abdominal pain, constipation, diarrhea, nausea and vomiting.   Genitourinary: Negative for difficulty urinating.   Musculoskeletal: Positive for back pain, myalgias, neck pain and neck stiffness. Negative for gait problem.   Skin: Negative for rash.    Neurological: Negative for dizziness, weakness, light-headedness, numbness and headaches.   Psychiatric/Behavioral: Negative for hallucinations, sleep disturbance and suicidal ideas. The patient is not nervous/anxious.        Past Medical History:   Diagnosis Date    Allergy     Collapse of lung 1971    past history.     GERD (gastroesophageal reflux disease)     Gout attack     Hallux valgus (acquired), unspecified foot     Hyperlipidemia     Hypertension     Irregular heart rhythm     Knee pain, chronic        Past Surgical History:   Procedure Laterality Date    chest tube   1971    chest tube placement from collapsed lung     COLONOSCOPY N/A 1/10/2018    Procedure: COLONOSCOPY;  Surgeon: Buck Ferrer MD;  [facility]: [facility] ENDO;  Service: Endoscopy;  Laterality: N/A;    ESOPHAGOGASTRODUODENOSCOPY N/A 3/25/2019    Procedure: EGD (ESOPHAGOGASTRODUODENOSCOPY);  Surgeon: Buck Ferrer MD;  Location: [facility];  Service: Endoscopy;  Laterality: N/A;       Social History     Socioeconomic History    Marital status:     Number of children: 1   Occupational History    Occupation: retired from school board    Tobacco Use    Smoking status: Never Smoker    Smokeless tobacco: Never Used   Substance and Sexual Activity    Alcohol use: Yes     Alcohol/week: 1.0 standard drink     Types: 1 Cans of beer per week     Comment: six pack per day & more over the weekend     Drug use: No    Sexual activity: Yes     Partners: Female     Birth control/protection: None       Review of patient's allergies indicates:  No Known Allergies    Current Outpatient Medications on File Prior to Visit   Medication Sig Dispense Refill    amLODIPine (NORVASC) 10 MG tablet Take 1 tablet (10 mg total) by mouth once daily. 30 tablet 0    cetirizine (ZYRTEC) 10 MG tablet Take 1 tablet (10 mg total) by mouth once daily. 15 tablet 0    clotrimazole (LOTRIMIN) 1 % cream Apply to affected area 2 times daily 30 g 1     ergocalciferol (ERGOCALCIFEROL) 50,000 unit Cap Take 1 capsule (50,000 Units total) by mouth every 30 days. 12 capsule 0    lisinopriL 10 MG tablet Take 1 tablet (10 mg total) by mouth once daily. 90 tablet 0    meloxicam (MOBIC) 15 MG tablet Take 1 tablet (15 mg total) by mouth once daily. 30 tablet 2    pantoprazole (PROTONIX) 40 MG tablet Take 1 tablet (40 mg total) by mouth once daily. 90 tablet 0    simvastatin (ZOCOR) 10 MG tablet Take 1 tablet (10 mg total) by mouth every evening. 90 tablet 0    [DISCONTINUED] gabapentin (NEURONTIN) 300 MG capsule Take 1 capsule (300 mg total) by mouth 3 (three) times daily. Start once daily, then BID on day 2,  then TID, prn on day 3 90 capsule 5    FLUZONE HIGHDOSE QUAD 20-21  mcg/0.7 mL Syrg ADM 0.7ML IM UTD      lisinopriL (PRINIVIL,ZESTRIL) 5 MG tablet TAKE 1 TABLET(5 MG) BY MOUTH EVERY DAY (Patient not taking: Reported on 3/31/2022) 90 tablet 0     Current Facility-Administered Medications on File Prior to Visit   Medication Dose Route Frequency Provider Last Rate Last Admin    [COMPLETED] ketorolac injection 30 mg  30 mg Intramuscular ED 1 Time Wesley Eugene MD   30 mg at 03/30/22 1224    [COMPLETED] methylPREDNISolone acetate injection 40 mg  40 mg Intramuscular ED 1 Time Wesley Eugene MD   40 mg at 03/30/22 1224    triamcinolone acetonide injection 40 mg  40 mg Intra-articular 1 time in Clinic/HOD Luis Miguel Chi MD           Objective:      /84 (BP Location: Right arm, Patient Position: Sitting, BP Method: Large (Automatic))   Pulse 64   Resp 16   Ht 6' (1.829 m)   Wt 114.8 kg (253 lb 3.2 oz)   SpO2 96%   BMI 34.34 kg/m²     Exam:  GEN:  Well developed, well nourished.  No acute distress.   HEENT:  No trauma.  Mucous membranes moist.  Nares patent bilaterally.  PSYCH: Normal affect. Thought content appropriate.  CHEST:  Breathing symmetric.  No audible wheezing.  ABD: Soft, non-distended.  SKIN:  Warm, pink, dry.   No rash on exposed areas.    EXT:  No cyanosis, clubbing, or edema.  No color change or changes in nail or hair growth.  NEURO/MUSCULOSKELETAL:  Fully alert, oriented, and appropriate. Speech normal milvia. No cranial nerve deficits.   Gait:  Normal.  No focal motor deficits.           Imaging:    Narrative & Impression    EXAMINATION:  XR CERVICAL SPINE COMPLETE 5 VIEW     CLINICAL HISTORY:  . Cervicalgia     TECHNIQUE:  AP, Lateral, bilateral oblique and open mouth views of the cervical spine were performed.     COMPARISON:  06/25/2019     FINDINGS:  Cervical vertebral body heights and alignment are preserved with multilevel spondylitic spurring and disc narrowing at the mid and lower cervical levels.  There is straightening and reversal of the normal cervical lordosis.     Posterior elements and neural foramina intact.     No prevertebral soft tissue swelling     Impression:     Multilevel spondylosis        Electronically signed by: Buck Young Jr  Date:                                            09/10/2021  Time:                                           10:52         Assessment:       Encounter Diagnoses   Name Primary?    Cervical radiculopathy     DDD (degenerative disc disease), cervical Yes         Plan:       Ignacio was seen today for shoulder pain.    Diagnoses and all orders for this visit:    DDD (degenerative disc disease), cervical    Cervical radiculopathy  -     gabapentin (NEURONTIN) 300 MG capsule; Take 2 capsules (600 mg total) by mouth 3 (three) times daily.        Ignacio Pelaez Jr. is a 71 y.o. male with acute left-sided neck.  Likely related it to exacerbation of chronic cervical degenerative disc disease/spondylosis..  Symptoms significantly improved following injection done the emergency department yesterday.  Cervical x-rays do show significant multilevel degenerative disc disease.      1.  Pertinent imaging studies reviewed by me. Imaging results were discussed with patient.  2.   Gradually increase gabapentin to 600 mg t.i.d. as tolerated/needed.  Patient was given instructions on how to do this.  3.  If pain does not improve or worsens, then will order cervical MRI.  Patient can call to report his symptoms and MRI can be ordered at that time and follow-up visit to discuss results.         This note was created by combination of typed  and M-Modal dictation. Transcription and phonetic errors may be present.  If there are any questions, please contact me.

## 2022-08-25 ENCOUNTER — OFFICE VISIT (OUTPATIENT)
Dept: PAIN MEDICINE | Facility: CLINIC | Age: 72
End: 2022-08-25
Payer: MEDICARE

## 2022-08-25 VITALS
DIASTOLIC BLOOD PRESSURE: 89 MMHG | OXYGEN SATURATION: 95 % | HEART RATE: 82 BPM | BODY MASS INDEX: 33.19 KG/M2 | WEIGHT: 258.63 LBS | HEIGHT: 74 IN | SYSTOLIC BLOOD PRESSURE: 133 MMHG

## 2022-08-25 DIAGNOSIS — M54.12 CERVICAL RADICULOPATHY: ICD-10-CM

## 2022-08-25 DIAGNOSIS — M47.812 CERVICAL SPONDYLOSIS: ICD-10-CM

## 2022-08-25 DIAGNOSIS — M50.30 DDD (DEGENERATIVE DISC DISEASE), CERVICAL: Primary | ICD-10-CM

## 2022-08-25 PROCEDURE — 99214 PR OFFICE/OUTPT VISIT, EST, LEVL IV, 30-39 MIN: ICD-10-PCS | Mod: S$PBB,,, | Performed by: PAIN MEDICINE

## 2022-08-25 PROCEDURE — 99214 OFFICE O/P EST MOD 30 MIN: CPT | Mod: S$PBB,,, | Performed by: PAIN MEDICINE

## 2022-08-25 PROCEDURE — 99213 OFFICE O/P EST LOW 20 MIN: CPT | Mod: PBBFAC,PN | Performed by: PAIN MEDICINE

## 2022-08-25 PROCEDURE — 99999 PR PBB SHADOW E&M-EST. PATIENT-LVL III: CPT | Mod: PBBFAC,,, | Performed by: PAIN MEDICINE

## 2022-08-25 PROCEDURE — 99999 PR PBB SHADOW E&M-EST. PATIENT-LVL III: ICD-10-PCS | Mod: PBBFAC,,, | Performed by: PAIN MEDICINE

## 2022-08-25 RX ORDER — IBUPROFEN 800 MG/1
800 TABLET ORAL 3 TIMES DAILY
Qty: 90 TABLET | Refills: 0 | Status: SHIPPED | OUTPATIENT
Start: 2022-08-25 | End: 2022-09-24

## 2022-08-25 NOTE — PROGRESS NOTES
Subjective:     Patient ID: Ignacio Pelaez Jr. is a 72 y.o. male    Chief Complaint: Neck Pain (Neck Pain started again on Sunday past. Worse when laying down. Gabapentin has not helped as planned.)      Referred by: No ref. provider found      HPI:    Interval History (8/25/22):  He returns today for follow up.  He reports that his neck pain is recurring.  Much more severe this time.  Significantly limited range of motion.  Having some pain radiating to the left shoulder but otherwise no pain in the upper extremities.  Pain does radiate inferiorly along the spine.  Denies any new numbness, tingling, weakness, bowel bladder dysfunction.  Gabapentin has not been helpful.  Denies any adverse effects from this medication.       Interval History (3/31/22):  He returns today for follow up.  He reports that he is having acute exacerbation of left-sided neck pain.  This started roughly 5 days ago.  No specific inciting events or injuries noted.  The pain is located left lower cervical paraspinal region left upper trapezius.  The pain did not radiate to left upper extremity.  He denies any associated numbness, tingling, weakness, bowel bladder dysfunction.  He went to the emergency department yesterday and received injection for his pain.  He states this significantly helped his pain.  He is also taking gabapentin with some relief.  He denies any adverse effects this medication.        Interval History (3/3/22):  He returns today for follow up.  He reports that he is doing well.  States that his back pain has mostly resolved since last encounter.  Still has some pain when he over exerts himself but this is fairly mild.  He also reports that his neck pain continues to be resolved.  He does not feel that further treatment is needed at this time.  He does continue to take gabapentin on an as-needed basis finds this is helpful when he is in pain.      Interval History (1/18/22):  He returns today for follow up.  He reports that  physical therapy has been helpful for the neck pain.  He reports his neck pain is essentially resolved.  He continues to attend physical therapy and performs regular home exercise program.  He does state however that he is having acute low back pain.  This pain started less than 1 week ago.  States he might have pulled muscle.  He denies any pain radiating to the lower extremities.  He denies any associated numbness, tingling, weakness, bowel bladder dysfunction.      Initial Encounter (11/23/21):  Ignacio Pelaez Jr. is a 72 y.o. male who presents today with acute neck pain.  This pain started within the past month.  No specific inciting events or injuries noted.  At this time, the pain is mostly resolved but still slightly bothersome.  However the time of onset it was very severe and very limiting.  The pain is located the bilateral lower cervical paraspinal regions.  The pain radiated across the upper back did not radiate into the upper extremities.  He denies any associated numbness, tingling, weakness, bowel bladder dysfunction.  He was seen at the urgent care and given an intramuscular injection and started on meloxicam and gabapentin.  Together, these significantly improved his pain.  He still utilizes gabapentin and meloxicam as needed but fairly infrequently..   This pain is described in detail below.    Physical Therapy:  Yes.    Non-pharmacologic Treatment:  Rest helps         · TENS?  No    Pain Medications:         · Currently taking:      · Has tried in the past:  Gabapentin, meloxicam    · Has not tried:  Opioids, Tylenol, Muscle relaxants, TCAs, SNRIs, topical creams    Blood thinners:  None    Interventional Therapies:  None    Relevant Surgeries:  None    Affecting sleep?  No    Affecting daily activities? no    Depressive symptoms? no          · SI/HI? No    Work status: Retired    Pain Scores:    Best:       4/10  Worst:     10/10  Usually:   7/10  Today:    8/10    Review of Systems    Constitutional: Negative for activity change, appetite change, chills, fatigue, fever and unexpected weight change.   HENT: Negative for hearing loss.    Eyes: Negative for visual disturbance.   Respiratory: Negative for chest tightness and shortness of breath.    Cardiovascular: Negative for chest pain.   Gastrointestinal: Negative for abdominal pain, constipation, diarrhea, nausea and vomiting.   Genitourinary: Negative for difficulty urinating.   Musculoskeletal: Positive for back pain, myalgias, neck pain and neck stiffness. Negative for gait problem.   Skin: Negative for rash.   Neurological: Negative for dizziness, weakness, light-headedness, numbness and headaches.   Psychiatric/Behavioral: Negative for hallucinations, sleep disturbance and suicidal ideas. The patient is not nervous/anxious.        Past Medical History:   Diagnosis Date    Allergy     Collapse of lung 1971    past history.     GERD (gastroesophageal reflux disease)     Gout attack     Hallux valgus (acquired), unspecified foot     Hyperlipidemia     Hypertension     Irregular heart rhythm     Knee pain, chronic        Past Surgical History:   Procedure Laterality Date    chest tube   1971    chest tube placement from collapsed lung     COLONOSCOPY N/A 1/10/2018    Procedure: COLONOSCOPY;  Surgeon: Buck Ferrer MD;  Location: [facility];  Service: Endoscopy;  Laterality: N/A;    ESOPHAGOGASTRODUODENOSCOPY N/A 3/25/2019    Procedure: EGD (ESOPHAGOGASTRODUODENOSCOPY);  Surgeon: Buck Ferrer MD;  Location: [facility];  Service: Endoscopy;  Laterality: N/A;       Social History     Socioeconomic History    Marital status:     Number of children: 1   Occupational History    Occupation: retired from school board    Tobacco Use    Smoking status: Never Smoker    Smokeless tobacco: Never Used   Substance and Sexual Activity    Alcohol use: Yes     Alcohol/week: 1.0 standard drink     Types: 1 Cans of beer per week      "Comment: six pack per day & more over the weekend     Drug use: No    Sexual activity: Yes     Partners: Female     Birth control/protection: None       Review of patient's allergies indicates:  No Known Allergies    Current Outpatient Medications on File Prior to Visit   Medication Sig Dispense Refill    allopurinoL (ZYLOPRIM) 100 MG tablet Take 1 tablet (100 mg total) by mouth once daily. 90 tablet 0    amLODIPine (NORVASC) 10 MG tablet Take 1 tablet (10 mg total) by mouth once daily. 90 tablet 0    cetirizine (ZYRTEC) 10 MG tablet Take 1 tablet (10 mg total) by mouth once daily. 15 tablet 0    colchicine (COLCRYS) 0.6 mg tablet Take 1 tablet (0.6 mg total) by mouth once daily. Take 2 tablets at once then 1 tablet 1 hour later. Continue to take 1 pill daily until resolution of symptoms. 30 tablet 0    ergocalciferol (VITAMIN D2) 50,000 unit Cap Take 1 capsule (50,000 Units total) by mouth every 30 days. 12 capsule 0    FLUZONE HIGHDOSE QUAD 20-21  mcg/0.7 mL Syrg ADM 0.7ML IM UTD      gabapentin (NEURONTIN) 300 MG capsule Take 2 capsules (600 mg total) by mouth 3 (three) times daily. 180 capsule 5    lisinopriL 10 MG tablet Take 1 tablet (10 mg total) by mouth once daily. 90 tablet 0    pantoprazole (PROTONIX) 40 MG tablet Take 1 tablet (40 mg total) by mouth once daily. 90 tablet 0    simvastatin (ZOCOR) 10 MG tablet Take 1 tablet (10 mg total) by mouth every evening. 90 tablet 0     Current Facility-Administered Medications on File Prior to Visit   Medication Dose Route Frequency Provider Last Rate Last Admin    triamcinolone acetonide injection 40 mg  40 mg Intra-articular 1 time in Clinic/HOD Luis Miguel Chi MD           Objective:      /89 (BP Location: Left arm, Patient Position: Sitting, BP Method: Large (Automatic))   Pulse 82   Ht 6' 2" (1.88 m)   Wt 117.3 kg (258 lb 9.6 oz)   SpO2 95%   BMI 33.20 kg/m²     Exam:  GEN:  Well developed, well nourished.  No acute " distress.   HEENT:  No trauma.  Mucous membranes moist.  Nares patent bilaterally.  PSYCH: Normal affect. Thought content appropriate.  CHEST:  Breathing symmetric.  No audible wheezing.  ABD: Soft, non-distended.  SKIN:  Warm, pink, dry.  No rash on exposed areas.    EXT:  No cyanosis, clubbing, or edema.  No color change or changes in nail or hair growth.  NEURO/MUSCULOSKELETAL:  Fully alert, oriented, and appropriate. Speech normal milvia. No cranial nerve deficits.   Gait:  Normal.  No focal motor deficits.           Imaging:    Narrative & Impression    EXAMINATION:  XR CERVICAL SPINE COMPLETE 5 VIEW     CLINICAL HISTORY:  . Cervicalgia     TECHNIQUE:  AP, Lateral, bilateral oblique and open mouth views of the cervical spine were performed.     COMPARISON:  06/25/2019     FINDINGS:  Cervical vertebral body heights and alignment are preserved with multilevel spondylitic spurring and disc narrowing at the mid and lower cervical levels.  There is straightening and reversal of the normal cervical lordosis.     Posterior elements and neural foramina intact.     No prevertebral soft tissue swelling     Impression:     Multilevel spondylosis        Electronically signed by: Buck Young Jr  Date:                                            09/10/2021  Time:                                           10:52         Assessment:       Encounter Diagnoses   Name Primary?    DDD (degenerative disc disease), cervical Yes    Cervical radiculopathy     Cervical spondylosis          Plan:       Ignacio was seen today for neck pain.    Diagnoses and all orders for this visit:    DDD (degenerative disc disease), cervical  -     MRI Cervical Spine Without Contrast; Future  -     ibuprofen (ADVIL,MOTRIN) 800 MG tablet; Take 1 tablet (800 mg total) by mouth 3 (three) times daily.    Cervical radiculopathy  -     MRI Cervical Spine Without Contrast; Future  -     ibuprofen (ADVIL,MOTRIN) 800 MG tablet; Take 1 tablet (800 mg  total) by mouth 3 (three) times daily.    Cervical spondylosis  -     MRI Cervical Spine Without Contrast; Future  -     ibuprofen (ADVIL,MOTRIN) 800 MG tablet; Take 1 tablet (800 mg total) by mouth 3 (three) times daily.        Ignacio Pelaez Jr. is a 72 y.o. male with acute exacerbation of left greater than right-sided neck.  Likely related it to exacerbation of chronic cervical degenerative disc disease/spondylosis..  Cervical x-rays do show significant multilevel degenerative disc disease.      1.  Cervical MRI to better evaluate soft tissues of cervical spine.    2.  Start ibuprofen 800 mg t.i.d. p.r.n..    3.  Return to clinic after MRI to review results.  May consider epidural steroid injection versus cervical medial branch blocks/RFA.    This note was created by combination of typed  and M-Modal dictation. Transcription and phonetic errors may be present.  If there are any questions, please contact me.

## 2022-09-01 ENCOUNTER — HOSPITAL ENCOUNTER (OUTPATIENT)
Dept: RADIOLOGY | Facility: HOSPITAL | Age: 72
Discharge: HOME | End: 2022-09-01
Attending: PAIN MEDICINE
Payer: MEDICARE

## 2022-09-01 DIAGNOSIS — M47.812 CERVICAL SPONDYLOSIS: ICD-10-CM

## 2022-09-01 DIAGNOSIS — M50.30 DDD (DEGENERATIVE DISC DISEASE), CERVICAL: ICD-10-CM

## 2022-09-01 DIAGNOSIS — M54.12 CERVICAL RADICULOPATHY: ICD-10-CM

## 2022-09-01 PROCEDURE — 72141 MRI NECK SPINE W/O DYE: CPT | Mod: 26,,, | Performed by: RADIOLOGY

## 2022-09-01 PROCEDURE — 72141 MRI CERVICAL SPINE WITHOUT CONTRAST: ICD-10-PCS | Mod: 26,,, | Performed by: RADIOLOGY

## 2022-09-01 PROCEDURE — 72141 MRI NECK SPINE W/O DYE: CPT | Mod: TC

## 2022-09-06 ENCOUNTER — OFFICE VISIT (OUTPATIENT)
Dept: PAIN MEDICINE | Facility: CLINIC | Age: 72
End: 2022-09-06
Payer: MEDICARE

## 2022-09-06 VITALS
SYSTOLIC BLOOD PRESSURE: 133 MMHG | HEART RATE: 75 BPM | OXYGEN SATURATION: 95 % | HEIGHT: 74 IN | DIASTOLIC BLOOD PRESSURE: 89 MMHG | WEIGHT: 257.06 LBS | BODY MASS INDEX: 32.99 KG/M2 | RESPIRATION RATE: 16 BRPM

## 2022-09-06 DIAGNOSIS — M54.12 CERVICAL RADICULOPATHY: ICD-10-CM

## 2022-09-06 DIAGNOSIS — M50.30 DDD (DEGENERATIVE DISC DISEASE), CERVICAL: ICD-10-CM

## 2022-09-06 DIAGNOSIS — M47.812 CERVICAL SPONDYLOSIS: Primary | ICD-10-CM

## 2022-09-06 PROCEDURE — 99999 PR PBB SHADOW E&M-EST. PATIENT-LVL III: CPT | Mod: PBBFAC,,, | Performed by: PAIN MEDICINE

## 2022-09-06 PROCEDURE — 99214 OFFICE O/P EST MOD 30 MIN: CPT | Mod: S$PBB,,, | Performed by: PAIN MEDICINE

## 2022-09-06 PROCEDURE — 99214 PR OFFICE/OUTPT VISIT, EST, LEVL IV, 30-39 MIN: ICD-10-PCS | Mod: S$PBB,,, | Performed by: PAIN MEDICINE

## 2022-09-06 PROCEDURE — 99999 PR PBB SHADOW E&M-EST. PATIENT-LVL III: ICD-10-PCS | Mod: PBBFAC,,, | Performed by: PAIN MEDICINE

## 2022-09-06 PROCEDURE — 99213 OFFICE O/P EST LOW 20 MIN: CPT | Mod: PBBFAC,PN | Performed by: PAIN MEDICINE

## 2022-09-06 NOTE — PROGRESS NOTES
Subjective:     Patient ID: Ignacio Pelaez Jr. is a 72 y.o. male    Chief Complaint: Follow-up (Results )      Referred by: No ref. provider found      HPI:    Interval History (9/6/22):  He returns today for follow up and MRI review.  He reports that his pain has completely resolved, but he does continue to have some neck stiffness.  Otherwise, he denies any changes in the quality location of his pain.  He does take ibuprofen and gabapentin as needed.  Finds these medications are helpful and well-tolerated.      Interval History (8/25/22):  He returns today for follow up.  He reports that his neck pain is recurring.  Much more severe this time.  Significantly limited range of motion.  Having some pain radiating to the left shoulder but otherwise no pain in the upper extremities.  Pain does radiate inferiorly along the spine.  Denies any new numbness, tingling, weakness, bowel bladder dysfunction.  Gabapentin has not been helpful.  Denies any adverse effects from this medication.       Interval History (3/31/22):  He returns today for follow up.  He reports that he is having acute exacerbation of left-sided neck pain.  This started roughly 5 days ago.  No specific inciting events or injuries noted.  The pain is located left lower cervical paraspinal region left upper trapezius.  The pain did not radiate to left upper extremity.  He denies any associated numbness, tingling, weakness, bowel bladder dysfunction.  He went to the emergency department yesterday and received injection for his pain.  He states this significantly helped his pain.  He is also taking gabapentin with some relief.  He denies any adverse effects this medication.        Interval History (3/3/22):  He returns today for follow up.  He reports that he is doing well.  States that his back pain has mostly resolved since last encounter.  Still has some pain when he over exerts himself but this is fairly mild.  He also reports that his neck pain continues  to be resolved.  He does not feel that further treatment is needed at this time.  He does continue to take gabapentin on an as-needed basis finds this is helpful when he is in pain.      Interval History (1/18/22):  He returns today for follow up.  He reports that physical therapy has been helpful for the neck pain.  He reports his neck pain is essentially resolved.  He continues to attend physical therapy and performs regular home exercise program.  He does state however that he is having acute low back pain.  This pain started less than 1 week ago.  States he might have pulled muscle.  He denies any pain radiating to the lower extremities.  He denies any associated numbness, tingling, weakness, bowel bladder dysfunction.      Initial Encounter (11/23/21):  Ignacio Pelaez Jr. is a 72 y.o. male who presents today with acute neck pain.  This pain started within the past month.  No specific inciting events or injuries noted.  At this time, the pain is mostly resolved but still slightly bothersome.  However the time of onset it was very severe and very limiting.  The pain is located the bilateral lower cervical paraspinal regions.  The pain radiated across the upper back did not radiate into the upper extremities.  He denies any associated numbness, tingling, weakness, bowel bladder dysfunction.  He was seen at the urgent care and given an intramuscular injection and started on meloxicam and gabapentin.  Together, these significantly improved his pain.  He still utilizes gabapentin and meloxicam as needed but fairly infrequently..   This pain is described in detail below.    Physical Therapy:  Yes.    Non-pharmacologic Treatment:  Rest helps         TENS?  No    Pain Medications:         Currently taking:  Gabapentin, ibuprofen    Has tried in the past:  Meloxicam    Has not tried:  Opioids, Tylenol, Muscle relaxants, TCAs, SNRIs, topical creams    Blood thinners:  None    Interventional Therapies:  None    Relevant  Surgeries:  None    Affecting sleep?  No    Affecting daily activities? no    Depressive symptoms? no          SI/HI? No    Work status: Retired    Pain Scores:    Best:       4/10  Worst:     10/10  Usually:   7/10  Today:    8/10    Review of Systems   Constitutional:  Negative for activity change, appetite change, chills, fatigue, fever and unexpected weight change.   HENT:  Negative for hearing loss.    Eyes:  Negative for visual disturbance.   Respiratory:  Negative for chest tightness and shortness of breath.    Cardiovascular:  Negative for chest pain.   Gastrointestinal:  Negative for abdominal pain, constipation, diarrhea, nausea and vomiting.   Genitourinary:  Negative for difficulty urinating.   Musculoskeletal:  Positive for neck stiffness. Negative for back pain, gait problem, myalgias and neck pain.   Skin:  Negative for rash.   Neurological:  Negative for dizziness, weakness, light-headedness, numbness and headaches.   Psychiatric/Behavioral:  Negative for hallucinations, sleep disturbance and suicidal ideas. The patient is not nervous/anxious.      Past Medical History:   Diagnosis Date    Allergy     Collapse of lung 1971    past history.     GERD (gastroesophageal reflux disease)     Gout attack     Hallux valgus (acquired), unspecified foot     Hyperlipidemia     Hypertension     Irregular heart rhythm     Knee pain, chronic        Past Surgical History:   Procedure Laterality Date    chest tube   1971    chest tube placement from collapsed lung     COLONOSCOPY N/A 1/10/2018    Procedure: COLONOSCOPY;  Surgeon: Buck Ferrer MD;  Location: [facility];  Service: Endoscopy;  Laterality: N/A;    ESOPHAGOGASTRODUODENOSCOPY N/A 3/25/2019    Procedure: EGD (ESOPHAGOGASTRODUODENOSCOPY);  Surgeon: Buck Ferrer MD;  Location: [facility];  Service: Endoscopy;  Laterality: N/A;       Social History     Socioeconomic History    Marital status:     Number of children: 1   Occupational History     Occupation: retired from school board    Tobacco Use    Smoking status: Never    Smokeless tobacco: Never   Substance and Sexual Activity    Alcohol use: Yes     Alcohol/week: 1.0 standard drink     Types: 1 Cans of beer per week     Comment: six pack per day & more over the weekend     Drug use: No    Sexual activity: Yes     Partners: Female     Birth control/protection: None       Review of patient's allergies indicates:  No Known Allergies    Current Outpatient Medications on File Prior to Visit   Medication Sig Dispense Refill    allopurinoL (ZYLOPRIM) 100 MG tablet Take 1 tablet (100 mg total) by mouth once daily. 90 tablet 0    amLODIPine (NORVASC) 10 MG tablet Take 1 tablet (10 mg total) by mouth once daily. 90 tablet 0    cetirizine (ZYRTEC) 10 MG tablet Take 1 tablet (10 mg total) by mouth once daily. 15 tablet 0    colchicine (COLCRYS) 0.6 mg tablet Take 1 tablet (0.6 mg total) by mouth once daily. Take 2 tablets at once then 1 tablet 1 hour later. Continue to take 1 pill daily until resolution of symptoms. 30 tablet 0    ergocalciferol (VITAMIN D2) 50,000 unit Cap Take 1 capsule (50,000 Units total) by mouth every 30 days. 12 capsule 0    FLUZONE HIGHDOSE QUAD 20-21  mcg/0.7 mL Syrg ADM 0.7ML IM UTD      gabapentin (NEURONTIN) 300 MG capsule Take 2 capsules (600 mg total) by mouth 3 (three) times daily. 180 capsule 5    ibuprofen (ADVIL,MOTRIN) 800 MG tablet Take 1 tablet (800 mg total) by mouth 3 (three) times daily. 90 tablet 0    lisinopriL 10 MG tablet Take 1 tablet (10 mg total) by mouth once daily. 90 tablet 0    pantoprazole (PROTONIX) 40 MG tablet Take 1 tablet (40 mg total) by mouth once daily. 90 tablet 0    simvastatin (ZOCOR) 10 MG tablet Take 1 tablet (10 mg total) by mouth every evening. 90 tablet 0     Current Facility-Administered Medications on File Prior to Visit   Medication Dose Route Frequency Provider Last Rate Last Admin    triamcinolone acetonide injection 40 mg  40 mg  "Intra-articular 1 time in Clinic/HOD Luis Miguel Chi MD           Objective:      /89 (BP Location: Left arm, Patient Position: Sitting, BP Method: Large (Automatic))   Pulse 75   Resp 16   Ht 6' 2" (1.88 m)   Wt 116.6 kg (257 lb 0.9 oz)   SpO2 95%   BMI 33.00 kg/m²     Exam:  GEN:  Well developed, well nourished.  No acute distress.   HEENT:  No trauma.  Mucous membranes moist.  Nares patent bilaterally.  PSYCH: Normal affect. Thought content appropriate.  CHEST:  Breathing symmetric.  No audible wheezing.  ABD: Soft, non-distended.  SKIN:  Warm, pink, dry.  No rash on exposed areas.    EXT:  No cyanosis, clubbing, or edema.  No color change or changes in nail or hair growth.  NEURO/MUSCULOSKELETAL:  Fully alert, oriented, and appropriate. Speech normal milvia. No cranial nerve deficits.   Gait:  Normal.  No focal motor deficits.           Imaging:      Narrative & Impression    EXAMINATION:  MRI CERVICAL SPINE WITHOUT CONTRAST     CLINICAL HISTORY:  Neck pain, chronic, degenerative changes on xray; Other cervical disc degeneration, unspecified cervical region     TECHNIQUE:  Multiplanar, multisequence MR images of the cervical spine were performed without the administration of contrast.     COMPARISON:  Cervical spine radiograph 03/30/2022     FINDINGS:  C1-C2: Dens is intact.  Pre dens space is maintained.     Alignment: Straightening of the cervical lordosis.  Minimal anterolisthesis C3 on C4.     Vertebrae: No acute fracture or abnormal marrow infiltrative process.  Degenerative endplate changes at C6-C7.     Discs: Multilevel mild-to-moderate disc height loss most significant C6-C7.  Normal signal.     Cord: Normal signal.     Skull base and craniocervical junction: Normal.     Degenerative findings:     C2-C3: Posterior disc osteophyte complex effacing the ventral thecal sac and mild left facet joint arthropathy.  Findings contribute to mild left neural foraminal narrowing.     C3-C4: " Posterior disc osteophyte complex abutting or flattening the cord and facet joint arthropathy greater on the left.  Findings contribute to moderate spinal canal stenosis with severe left and moderate right neural foraminal narrowing.     C4-C5: Posterior disc osteophyte complex contributing to moderate spinal canal stenosis and severe bilateral neural foraminal narrowing.     C5-C6: Posterior disc osteophyte complex and mild facet joint arthropathy contributing to moderate spinal canal stenosis and severe bilateral neural foraminal narrowing.     C6-C7: Posterior disc osteophyte complex with mild facet joint arthropathy contribute to moderate spinal canal stenosis and severe bilateral neural foraminal narrowing.     C7-T1: Posterior disc osteophyte complex contributing to mild bilateral neural foraminal narrowing.     Prominent posterior disc bulges at T1-T2 and T3-T4 contributing to neural foraminal narrowing on the right incompletely evaluated.     Paraspinal muscles & soft tissues: Left thyroid 2.0 cm thyroid nodule.     Impression:     Cervical spondylosis with multilevel moderate spinal canal stenosis and severe neural foraminal narrowing most significant at C3-C4.     2.0 cm left thyroid nodule.  Recommend further evaluation with nonemergent thyroid ultrasound per ACR guidelines.     Electronically signed by resident: Jaylan Garsia  Date:                                            09/01/2022  Time:                                           10:35     Electronically signed by: Benigno Esparza MD  Date:                                            09/01/2022  Time:                                           12:17         Narrative & Impression    EXAMINATION:  XR CERVICAL SPINE COMPLETE 5 VIEW     CLINICAL HISTORY:  . Cervicalgia     TECHNIQUE:  AP, Lateral, bilateral oblique and open mouth views of the cervical spine were performed.     COMPARISON:  06/25/2019     FINDINGS:  Cervical vertebral body heights and  alignment are preserved with multilevel spondylitic spurring and disc narrowing at the mid and lower cervical levels.  There is straightening and reversal of the normal cervical lordosis.     Posterior elements and neural foramina intact.     No prevertebral soft tissue swelling     Impression:     Multilevel spondylosis        Electronically signed by: Buck Young Jr  Date:                                            09/10/2021  Time:                                           10:52         Assessment:       Encounter Diagnoses   Name Primary?    Cervical spondylosis Yes    DDD (degenerative disc disease), cervical     Cervical radiculopathy          Plan:       Ignacio was seen today for follow-up.    Diagnoses and all orders for this visit:    Cervical spondylosis    DDD (degenerative disc disease), cervical    Cervical radiculopathy      Ignacio Pelaez Jr. is a 72 y.o. male with acute exacerbation of left greater than right-sided neck.  Likely related it to exacerbation of chronic cervical degenerative disc disease/spondylosis..  Cervical x-rays do show significant multilevel degenerative disc disease.  Pain spontaneously resolved though still has residual stiffness.  Has multilevel degenerative disc disease and facet spondylosis noted on cervical MRI.    1.  Pertinent imaging studies reviewed by me. Imaging results were discussed with patient.  2.  Continue gabapentin and ibuprofen as needed.    3.  Continue regular home exercise program.    4.  Return to clinic as needed.  May consider cervical epidural steroid injection if pain persists or worsens.      This note was created by combination of typed  and M-Modal dictation. Transcription and phonetic errors may be present.  If there are any questions, please contact me.

## 2022-10-03 ENCOUNTER — TELEPHONE (OUTPATIENT)
Dept: PAIN MEDICINE | Facility: CLINIC | Age: 72
End: 2022-10-03
Payer: MEDICARE

## 2022-10-03 NOTE — TELEPHONE ENCOUNTER
----- Message from Kaur White sent at 10/3/2022  8:25 AM CDT -----  Regarding: speak with office  Contact: elena La is calling to set appt ....[phone] (home)

## 2022-10-04 ENCOUNTER — OFFICE VISIT (OUTPATIENT)
Dept: PAIN MEDICINE | Facility: CLINIC | Age: 72
End: 2022-10-04
Payer: MEDICARE

## 2022-10-04 VITALS
DIASTOLIC BLOOD PRESSURE: 78 MMHG | SYSTOLIC BLOOD PRESSURE: 114 MMHG | WEIGHT: 259.94 LBS | HEIGHT: 74 IN | HEART RATE: 70 BPM | OXYGEN SATURATION: 93 % | BODY MASS INDEX: 33.36 KG/M2

## 2022-10-04 DIAGNOSIS — M50.30 DDD (DEGENERATIVE DISC DISEASE), CERVICAL: Primary | ICD-10-CM

## 2022-10-04 DIAGNOSIS — M47.812 CERVICAL SPONDYLOSIS: ICD-10-CM

## 2022-10-04 DIAGNOSIS — M54.12 CERVICAL RADICULOPATHY: ICD-10-CM

## 2022-10-04 PROCEDURE — 99214 OFFICE O/P EST MOD 30 MIN: CPT | Mod: S$PBB,,, | Performed by: PAIN MEDICINE

## 2022-10-04 PROCEDURE — 99214 PR OFFICE/OUTPT VISIT, EST, LEVL IV, 30-39 MIN: ICD-10-PCS | Mod: S$PBB,,, | Performed by: PAIN MEDICINE

## 2022-10-04 PROCEDURE — 99214 OFFICE O/P EST MOD 30 MIN: CPT | Mod: PBBFAC,PN | Performed by: PAIN MEDICINE

## 2022-10-04 PROCEDURE — 99999 PR PBB SHADOW E&M-EST. PATIENT-LVL IV: CPT | Mod: PBBFAC,,, | Performed by: PAIN MEDICINE

## 2022-10-04 PROCEDURE — 99999 PR PBB SHADOW E&M-EST. PATIENT-LVL IV: ICD-10-PCS | Mod: PBBFAC,,, | Performed by: PAIN MEDICINE

## 2022-10-04 NOTE — PATIENT INSTRUCTIONS
Reviewed pre op instructions with patient:    Please leave jewelry and valuables at home or give them to your escort for safe keeping.  You will be required to have an adult to escort you after the procedure is over. Although we will not be sedating you for your procedure we ask for an escort for safety after the procedure.  Do not take over the counter blood thinning medications beginning 7 days before the procedure (aspirin, Motrin, ibuprofen, Advil, Aleve, naproxen). If you are taking prescribed thinners (Coumadin, warfarin, apixaban, Eliquis, Plavix, clopidogrel, Pletal, etc) we will obtain cardiac clearance from your cardiologist or provider that is monitoring your medications and levels to hold the medications if appropriate.  Cleanse your skin the evening before with an antibacterial soap (Dial or Hibiclens) and then repeat it again the morning of the procedure.  Do not apply lotions, creams, deodorants, perfumes, or colognes the day of the procedure.  You will need to have your COVID testing done on the Monday before the scheduled procedure if you have not been vaccinated.  You will be contacted the day before the procedure to be given the time to arrive the day of the procedure. If you are not contacted by the afternoon before the procedure you should contact [phone].  Please do not eat or drink after midnight before the procedure.    Patient verbalized understanding of the instructions provided to them and clinic contact number was provided for any further questions they may have.

## 2022-10-04 NOTE — PROGRESS NOTES
Subjective:     Patient ID: Ignacio Pelaez Jr. is a 72 y.o. male    Chief Complaint: Neck Pain (Patient's neck pain has been gradually increasing instead of improving. Worsens as the day progresses.)      Referred by: No ref. provider found      HPI:    Interval History (10/4/22):  He returns today for follow up.  He reports that his neck pain has returned. He denies any changes in the quality location the pain.  He denies any new or worsening symptoms.      Interval History (9/6/22):  He returns today for follow up and MRI review.  He reports that his pain has completely resolved, but he does continue to have some neck stiffness.  Otherwise, he denies any changes in the quality location of his pain.  He does take ibuprofen and gabapentin as needed.  Finds these medications are helpful and well-tolerated.      Interval History (8/25/22):  He returns today for follow up.  He reports that his neck pain is recurring.  Much more severe this time.  Significantly limited range of motion.  Having some pain radiating to the left shoulder but otherwise no pain in the upper extremities.  Pain does radiate inferiorly along the spine.  Denies any new numbness, tingling, weakness, bowel bladder dysfunction.  Gabapentin has not been helpful.  Denies any adverse effects from this medication.       Interval History (3/31/22):  He returns today for follow up.  He reports that he is having acute exacerbation of left-sided neck pain.  This started roughly 5 days ago.  No specific inciting events or injuries noted.  The pain is located left lower cervical paraspinal region left upper trapezius.  The pain did not radiate to left upper extremity.  He denies any associated numbness, tingling, weakness, bowel bladder dysfunction.  He went to the emergency department yesterday and received injection for his pain.  He states this significantly helped his pain.  He is also taking gabapentin with some relief.  He denies any adverse effects this  medication.        Interval History (3/3/22):  He returns today for follow up.  He reports that he is doing well.  States that his back pain has mostly resolved since last encounter.  Still has some pain when he over exerts himself but this is fairly mild.  He also reports that his neck pain continues to be resolved.  He does not feel that further treatment is needed at this time.  He does continue to take gabapentin on an as-needed basis finds this is helpful when he is in pain.      Interval History (1/18/22):  He returns today for follow up.  He reports that physical therapy has been helpful for the neck pain.  He reports his neck pain is essentially resolved.  He continues to attend physical therapy and performs regular home exercise program.  He does state however that he is having acute low back pain.  This pain started less than 1 week ago.  States he might have pulled muscle.  He denies any pain radiating to the lower extremities.  He denies any associated numbness, tingling, weakness, bowel bladder dysfunction.      Initial Encounter (11/23/21):  Ignacio Pelaez Jr. is a 72 y.o. male who presents today with acute neck pain.  This pain started within the past month.  No specific inciting events or injuries noted.  At this time, the pain is mostly resolved but still slightly bothersome.  However the time of onset it was very severe and very limiting.  The pain is located the bilateral lower cervical paraspinal regions.  The pain radiated across the upper back did not radiate into the upper extremities.  He denies any associated numbness, tingling, weakness, bowel bladder dysfunction.  He was seen at the urgent care and given an intramuscular injection and started on meloxicam and gabapentin.  Together, these significantly improved his pain.  He still utilizes gabapentin and meloxicam as needed but fairly infrequently..   This pain is described in detail below.    Physical Therapy:  Yes.    Non-pharmacologic  Treatment:  Rest helps         TENS?  No    Pain Medications:         Currently taking:  Gabapentin, ibuprofen    Has tried in the past:  Meloxicam    Has not tried:  Opioids, Tylenol, Muscle relaxants, TCAs, SNRIs, topical creams    Blood thinners:  None    Interventional Therapies:  None    Relevant Surgeries:  None    Affecting sleep?  No    Affecting daily activities? no    Depressive symptoms? no          SI/HI? No    Work status: Retired    Pain Scores:    Best:       1/10  Worst:     9/10  Usually:   5/10  Today:    5/10    Review of Systems   Constitutional:  Negative for activity change, appetite change, chills, fatigue, fever and unexpected weight change.   HENT:  Negative for hearing loss.    Eyes:  Negative for visual disturbance.   Respiratory:  Negative for chest tightness and shortness of breath.    Cardiovascular:  Negative for chest pain.   Gastrointestinal:  Negative for abdominal pain, constipation, diarrhea, nausea and vomiting.   Genitourinary:  Negative for difficulty urinating.   Musculoskeletal:  Positive for neck stiffness. Negative for back pain, gait problem, myalgias and neck pain.   Skin:  Negative for rash.   Neurological:  Negative for dizziness, weakness, light-headedness, numbness and headaches.   Psychiatric/Behavioral:  Negative for hallucinations, sleep disturbance and suicidal ideas. The patient is not nervous/anxious.      Past Medical History:   Diagnosis Date    Allergy     Collapse of lung 1971    past history.     GERD (gastroesophageal reflux disease)     Gout attack     Hallux valgus (acquired), unspecified foot     Hyperlipidemia     Hypertension     Irregular heart rhythm     Knee pain, chronic        Past Surgical History:   Procedure Laterality Date    chest tube   1971    chest tube placement from collapsed lung     COLONOSCOPY N/A 1/10/2018    Procedure: COLONOSCOPY;  Surgeon: Buck Ferrer MD;  Location: [facility];  Service: Endoscopy;  Laterality: N/A;     ESOPHAGOGASTRODUODENOSCOPY N/A 3/25/2019    Procedure: EGD (ESOPHAGOGASTRODUODENOSCOPY);  Surgeon: Buck Ferrer MD;  Location: [facility];  Service: Endoscopy;  Laterality: N/A;       Social History     Socioeconomic History    Marital status:     Number of children: 1   Occupational History    Occupation: retired from school board    Tobacco Use    Smoking status: Never    Smokeless tobacco: Never   Substance and Sexual Activity    Alcohol use: Yes     Alcohol/week: 1.0 standard drink     Types: 1 Cans of beer per week     Comment: six pack per day & more over the weekend     Drug use: No    Sexual activity: Yes     Partners: Female     Birth control/protection: None       Review of patient's allergies indicates:  No Known Allergies    Current Outpatient Medications on File Prior to Visit   Medication Sig Dispense Refill    allopurinoL (ZYLOPRIM) 100 MG tablet Take 1 tablet (100 mg total) by mouth once daily. 90 tablet 0    amLODIPine (NORVASC) 10 MG tablet Take 1 tablet (10 mg total) by mouth once daily. 90 tablet 0    cetirizine (ZYRTEC) 10 MG tablet Take 1 tablet (10 mg total) by mouth once daily. 15 tablet 0    colchicine (COLCRYS) 0.6 mg tablet Take 1 tablet (0.6 mg total) by mouth once daily. Take 2 tablets at once then 1 tablet 1 hour later. Continue to take 1 pill daily until resolution of symptoms. 30 tablet 0    ergocalciferol (VITAMIN D2) 50,000 unit Cap Take 1 capsule (50,000 Units total) by mouth every 30 days. 12 capsule 0    FLUZONE HIGHDOSE QUAD 20-21  mcg/0.7 mL Syrg ADM 0.7ML IM UTD      gabapentin (NEURONTIN) 300 MG capsule Take 2 capsules (600 mg total) by mouth 3 (three) times daily. 180 capsule 5    lisinopriL 10 MG tablet Take 1 tablet (10 mg total) by mouth once daily. 90 tablet 0    pantoprazole (PROTONIX) 40 MG tablet Take 1 tablet (40 mg total) by mouth once daily. 90 tablet 0    simvastatin (ZOCOR) 10 MG tablet Take 1 tablet (10 mg total) by mouth every evening. 90 tablet  "0     Current Facility-Administered Medications on File Prior to Visit   Medication Dose Route Frequency Provider Last Rate Last Admin    triamcinolone acetonide injection 40 mg  40 mg Intra-articular 1 time in Clinic/HOD Luis Miguel Chi MD           Objective:      /78 (BP Location: Left arm, Patient Position: Sitting, BP Method: Large (Automatic))   Pulse 70   Ht 6' 2" (1.88 m)   Wt 117.9 kg (259 lb 14.8 oz)   SpO2 (!) 93%   BMI 33.37 kg/m²     Exam:  GEN:  Well developed, well nourished.  No acute distress.   HEENT:  No trauma.  Mucous membranes moist.  Nares patent bilaterally.  PSYCH: Normal affect. Thought content appropriate.  CHEST:  Breathing symmetric.  No audible wheezing.  ABD: Soft, non-distended.  SKIN:  Warm, pink, dry.  No rash on exposed areas.    EXT:  No cyanosis, clubbing, or edema.  No color change or changes in nail or hair growth.  NEURO/MUSCULOSKELETAL:  Fully alert, oriented, and appropriate. Speech normal milvia. No cranial nerve deficits.   Gait:  Normal.  No focal motor deficits.           Imaging:      Narrative & Impression    EXAMINATION:  MRI CERVICAL SPINE WITHOUT CONTRAST     CLINICAL HISTORY:  Neck pain, chronic, degenerative changes on xray; Other cervical disc degeneration, unspecified cervical region     TECHNIQUE:  Multiplanar, multisequence MR images of the cervical spine were performed without the administration of contrast.     COMPARISON:  Cervical spine radiograph 03/30/2022     FINDINGS:  C1-C2: Dens is intact.  Pre dens space is maintained.     Alignment: Straightening of the cervical lordosis.  Minimal anterolisthesis C3 on C4.     Vertebrae: No acute fracture or abnormal marrow infiltrative process.  Degenerative endplate changes at C6-C7.     Discs: Multilevel mild-to-moderate disc height loss most significant C6-C7.  Normal signal.     Cord: Normal signal.     Skull base and craniocervical junction: Normal.     Degenerative findings:     C2-C3: " Posterior disc osteophyte complex effacing the ventral thecal sac and mild left facet joint arthropathy.  Findings contribute to mild left neural foraminal narrowing.     C3-C4: Posterior disc osteophyte complex abutting or flattening the cord and facet joint arthropathy greater on the left.  Findings contribute to moderate spinal canal stenosis with severe left and moderate right neural foraminal narrowing.     C4-C5: Posterior disc osteophyte complex contributing to moderate spinal canal stenosis and severe bilateral neural foraminal narrowing.     C5-C6: Posterior disc osteophyte complex and mild facet joint arthropathy contributing to moderate spinal canal stenosis and severe bilateral neural foraminal narrowing.     C6-C7: Posterior disc osteophyte complex with mild facet joint arthropathy contribute to moderate spinal canal stenosis and severe bilateral neural foraminal narrowing.     C7-T1: Posterior disc osteophyte complex contributing to mild bilateral neural foraminal narrowing.     Prominent posterior disc bulges at T1-T2 and T3-T4 contributing to neural foraminal narrowing on the right incompletely evaluated.     Paraspinal muscles & soft tissues: Left thyroid 2.0 cm thyroid nodule.     Impression:     Cervical spondylosis with multilevel moderate spinal canal stenosis and severe neural foraminal narrowing most significant at C3-C4.     2.0 cm left thyroid nodule.  Recommend further evaluation with nonemergent thyroid ultrasound per ACR guidelines.     Electronically signed by resident: Jaylan Garsia  Date:                                            09/01/2022  Time:                                           10:35     Electronically signed by: Benigno Esparza MD  Date:                                            09/01/2022  Time:                                           12:17         Narrative & Impression    EXAMINATION:  XR CERVICAL SPINE COMPLETE 5 VIEW     CLINICAL HISTORY:  . Cervicalgia      TECHNIQUE:  AP, Lateral, bilateral oblique and open mouth views of the cervical spine were performed.     COMPARISON:  06/25/2019     FINDINGS:  Cervical vertebral body heights and alignment are preserved with multilevel spondylitic spurring and disc narrowing at the mid and lower cervical levels.  There is straightening and reversal of the normal cervical lordosis.     Posterior elements and neural foramina intact.     No prevertebral soft tissue swelling     Impression:     Multilevel spondylosis        Electronically signed by: Buck Young Jr  Date:                                            09/10/2021  Time:                                           10:52         Assessment:       Encounter Diagnoses   Name Primary?    DDD (degenerative disc disease), cervical Yes    Cervical radiculopathy     Cervical spondylosis            Plan:       Ignacio was seen today for neck pain.    Diagnoses and all orders for this visit:    DDD (degenerative disc disease), cervical    Cervical radiculopathy    Cervical spondylosis        Ignacio Pelaez Jr. is a 72 y.o. male with acute exacerbation of left greater than right-sided neck.  Likely related it to exacerbation of chronic cervical degenerative disc disease/spondylosis..  Cervical x-rays do show significant multilevel degenerative disc disease.  Pain spontaneously resolved though still has residual stiffness.  Has multilevel degenerative disc disease and facet spondylosis noted on cervical MRI.    1.  Pertinent imaging studies reviewed by me. Imaging results were discussed with patient.  2.  Continue gabapentin and ibuprofen as needed.    3.  Continue regular home exercise program.    4.  Schedule for C7-T1 interlaminar epidural steroid injection.    5.  Return to clinic after procedure to discuss results.      This note was created by combination of typed  and M-Modal dictation. Transcription and phonetic errors may be present.  If there are any questions,  please contact me.

## 2022-10-25 ENCOUNTER — OFFICE VISIT (OUTPATIENT)
Dept: PAIN MEDICINE | Facility: CLINIC | Age: 72
End: 2022-10-25
Payer: MEDICARE

## 2022-10-25 VITALS
DIASTOLIC BLOOD PRESSURE: 89 MMHG | BODY MASS INDEX: 33.57 KG/M2 | RESPIRATION RATE: 16 BRPM | OXYGEN SATURATION: 95 % | SYSTOLIC BLOOD PRESSURE: 142 MMHG | HEIGHT: 74 IN | WEIGHT: 261.56 LBS | HEART RATE: 64 BPM

## 2022-10-25 DIAGNOSIS — M47.812 CERVICAL SPONDYLOSIS: ICD-10-CM

## 2022-10-25 DIAGNOSIS — M50.30 DDD (DEGENERATIVE DISC DISEASE), CERVICAL: ICD-10-CM

## 2022-10-25 DIAGNOSIS — M54.12 CERVICAL RADICULOPATHY: Primary | ICD-10-CM

## 2022-10-25 PROCEDURE — 99999 PR PBB SHADOW E&M-EST. PATIENT-LVL IV: ICD-10-PCS | Mod: PBBFAC,,, | Performed by: PAIN MEDICINE

## 2022-10-25 PROCEDURE — 99214 OFFICE O/P EST MOD 30 MIN: CPT | Mod: S$PBB,,, | Performed by: PAIN MEDICINE

## 2022-10-25 PROCEDURE — 99999 PR PBB SHADOW E&M-EST. PATIENT-LVL IV: CPT | Mod: PBBFAC,,, | Performed by: PAIN MEDICINE

## 2022-10-25 PROCEDURE — 99214 OFFICE O/P EST MOD 30 MIN: CPT | Mod: PBBFAC,PN | Performed by: PAIN MEDICINE

## 2022-10-25 PROCEDURE — 99214 PR OFFICE/OUTPT VISIT, EST, LEVL IV, 30-39 MIN: ICD-10-PCS | Mod: S$PBB,,, | Performed by: PAIN MEDICINE

## 2022-10-25 NOTE — PROGRESS NOTES
Subjective:     Patient ID: Ignacio Pelaez Jr. is a 72 y.o. male    Chief Complaint: Follow-up      Referred by: No ref. provider found      HPI:    Interval History (10/25/22):  He returns today for follow up.  He reports that C7-T1 interlaminar epidural steroid injection has not been helpful for the left-sided neck pain.  Reports 100% relief but for only 5 days.  Otherwise, denies any changes in the quality or location of his pain.  He denies any new or worsening symptoms.      Interval History (10/4/22):  He returns today for follow up.  He reports that his neck pain has returned. He denies any changes in the quality location the pain.  He denies any new or worsening symptoms.      Interval History (9/6/22):  He returns today for follow up and MRI review.  He reports that his pain has completely resolved, but he does continue to have some neck stiffness.  Otherwise, he denies any changes in the quality location of his pain.  He does take ibuprofen and gabapentin as needed.  Finds these medications are helpful and well-tolerated.      Interval History (8/25/22):  He returns today for follow up.  He reports that his neck pain is recurring.  Much more severe this time.  Significantly limited range of motion.  Having some pain radiating to the left shoulder but otherwise no pain in the upper extremities.  Pain does radiate inferiorly along the spine.  Denies any new numbness, tingling, weakness, bowel bladder dysfunction.  Gabapentin has not been helpful.  Denies any adverse effects from this medication.       Interval History (3/31/22):  He returns today for follow up.  He reports that he is having acute exacerbation of left-sided neck pain.  This started roughly 5 days ago.  No specific inciting events or injuries noted.  The pain is located left lower cervical paraspinal region left upper trapezius.  The pain did not radiate to left upper extremity.  He denies any associated numbness, tingling, weakness, bowel  bladder dysfunction.  He went to the emergency department yesterday and received injection for his pain.  He states this significantly helped his pain.  He is also taking gabapentin with some relief.  He denies any adverse effects this medication.        Interval History (3/3/22):  He returns today for follow up.  He reports that he is doing well.  States that his back pain has mostly resolved since last encounter.  Still has some pain when he over exerts himself but this is fairly mild.  He also reports that his neck pain continues to be resolved.  He does not feel that further treatment is needed at this time.  He does continue to take gabapentin on an as-needed basis finds this is helpful when he is in pain.      Interval History (1/18/22):  He returns today for follow up.  He reports that physical therapy has been helpful for the neck pain.  He reports his neck pain is essentially resolved.  He continues to attend physical therapy and performs regular home exercise program.  He does state however that he is having acute low back pain.  This pain started less than 1 week ago.  States he might have pulled muscle.  He denies any pain radiating to the lower extremities.  He denies any associated numbness, tingling, weakness, bowel bladder dysfunction.      Initial Encounter (11/23/21):  Ignacio Pelaez Jr. is a 72 y.o. male who presents today with acute neck pain.  This pain started within the past month.  No specific inciting events or injuries noted.  At this time, the pain is mostly resolved but still slightly bothersome.  However the time of onset it was very severe and very limiting.  The pain is located the bilateral lower cervical paraspinal regions.  The pain radiated across the upper back did not radiate into the upper extremities.  He denies any associated numbness, tingling, weakness, bowel bladder dysfunction.  He was seen at the urgent care and given an intramuscular injection and started on meloxicam  and gabapentin.  Together, these significantly improved his pain.  He still utilizes gabapentin and meloxicam as needed but fairly infrequently..   This pain is described in detail below.    Physical Therapy:  Yes.    Non-pharmacologic Treatment:  Rest helps         TENS?  No    Pain Medications:         Currently taking:  Gabapentin, ibuprofen    Has tried in the past:  Meloxicam    Has not tried:  Opioids, Tylenol, Muscle relaxants, TCAs, SNRIs, topical creams    Blood thinners:  None    Interventional Therapies:    10/13/22 - C7-T1 interlaminar epidural steroid injection - 100% relief but for only 5 days    Relevant Surgeries:  None    Affecting sleep?  No    Affecting daily activities? no    Depressive symptoms? no          SI/HI? No    Work status: Retired    Pain Scores:    Best:       1/10  Worst:     9/10  Usually:   5/10  Today:    5/10    Review of Systems   Constitutional:  Negative for activity change, appetite change, chills, fatigue, fever and unexpected weight change.   HENT:  Negative for hearing loss.    Eyes:  Negative for visual disturbance.   Respiratory:  Negative for chest tightness and shortness of breath.    Cardiovascular:  Negative for chest pain.   Gastrointestinal:  Negative for abdominal pain, constipation, diarrhea, nausea and vomiting.   Genitourinary:  Negative for difficulty urinating.   Musculoskeletal:  Positive for myalgias, neck pain and neck stiffness. Negative for back pain and gait problem.   Skin:  Negative for rash.   Neurological:  Negative for dizziness, weakness, light-headedness, numbness and headaches.   Psychiatric/Behavioral:  Negative for hallucinations, sleep disturbance and suicidal ideas. The patient is not nervous/anxious.      Past Medical History:   Diagnosis Date    Allergy     Collapse of lung 1971    past history.     GERD (gastroesophageal reflux disease)     Gout attack     Hallux valgus (acquired), unspecified foot     Hyperlipidemia     Hypertension      Irregular heart rhythm     Knee pain, chronic        Past Surgical History:   Procedure Laterality Date    chest tube   1971    chest tube placement from collapsed lung     COLONOSCOPY N/A 01/10/2018    Procedure: COLONOSCOPY;  Surgeon: Buck Ferrer MD;  Location: [facility] ENDO;  Service: Endoscopy;  Laterality: N/A;    EPIDURAL STEROID INJECTION INTO CERVICAL SPINE N/A 10/13/2022    Procedure: Injection-steroid-epidural-cervical---C7-T1;  Surgeon: Delfin Cervantes Jr., MD;  Location: [facility] PAIN MGMT;  Service: Pain Management;  Laterality: N/A;    ESOPHAGOGASTRODUODENOSCOPY N/A 03/25/2019    Procedure: EGD (ESOPHAGOGASTRODUODENOSCOPY);  Surgeon: Buck Ferrer MD;  Location: [facility] ENDO;  Service: Endoscopy;  Laterality: N/A;    Injection-steroid-epidural-cervical---C7-T1 (N/A)  10/13/2022       Social History     Socioeconomic History    Marital status:     Number of children: 1   Occupational History    Occupation: retired from school board    Tobacco Use    Smoking status: Never    Smokeless tobacco: Never   Substance and Sexual Activity    Alcohol use: Yes     Alcohol/week: 1.0 standard drink     Types: 1 Cans of beer per week     Comment: six pack per day & more over the weekend     Drug use: No    Sexual activity: Yes     Partners: Female     Birth control/protection: None       Review of patient's allergies indicates:  No Known Allergies    Current Outpatient Medications on File Prior to Visit   Medication Sig Dispense Refill    allopurinoL (ZYLOPRIM) 100 MG tablet Take 1 tablet (100 mg total) by mouth once daily. 90 tablet 0    amLODIPine (NORVASC) 10 MG tablet Take 1 tablet (10 mg total) by mouth once daily. 90 tablet 0    cetirizine (ZYRTEC) 10 MG tablet Take 1 tablet (10 mg total) by mouth once daily. 15 tablet 0    colchicine (COLCRYS) 0.6 mg tablet Take 1 tablet (0.6 mg total) by mouth once daily. Take 2 tablets at once then 1 tablet 1 hour later. Continue to take 1 pill daily until resolution  "of symptoms. 30 tablet 0    ergocalciferol (VITAMIN D2) 50,000 unit Cap Take 1 capsule (50,000 Units total) by mouth every 30 days. 12 capsule 0    FLUZONE HIGHDOSE QUAD 20-21  mcg/0.7 mL Syrg ADM 0.7ML IM UTD      gabapentin (NEURONTIN) 100 MG capsule Take 100 mg by mouth 3 (three) times daily.      lisinopriL 10 MG tablet Take 1 tablet (10 mg total) by mouth once daily. 90 tablet 0    pantoprazole (PROTONIX) 40 MG tablet Take 1 tablet (40 mg total) by mouth once daily. 90 tablet 0    simvastatin (ZOCOR) 10 MG tablet Take 1 tablet (10 mg total) by mouth every evening. 90 tablet 0     Current Facility-Administered Medications on File Prior to Visit   Medication Dose Route Frequency Provider Last Rate Last Admin    ALPRAZolam dissolvable tablet 1 mg  1 mg Oral Once PRN Delfin Cervantes Jr., MD        dexAMETHasone sodium phos (PF) injection 10 mg  10 mg Epidural Once Delfin Cervantes Jr., MD        iohexoL (OMNIPAQUE 240) injection 5 mL  5 mL Intravenous ONCE PRN Delfin Cervantes Jr., MD        LIDOcaine (PF) 10 mg/ml (1%) injection 10 mg  1 mL Other Once Delfin Cervantes Jr., MD        LIDOcaine (PF) 20 mg/mL (2%) injection 200 mg  10 mL Other Once Delfin Cervantes Jr., MD        triamcinolone acetonide injection 40 mg  40 mg Intra-articular 1 time in Clinic/HOD Luis Miguel Chi MD           Objective:      BP (!) 142/89 (BP Location: Left arm, Patient Position: Sitting, BP Method: Large (Automatic)) Comment: did not take bp medications today  Pulse 64   Resp 16   Ht 6' 2" (1.88 m)   Wt 118.6 kg (261 lb 9.2 oz)   SpO2 95%   BMI 33.58 kg/m²     Exam:  GEN:  Well developed, well nourished.  No acute distress.   HEENT:  No trauma.  Mucous membranes moist.  Nares patent bilaterally.  PSYCH: Normal affect. Thought content appropriate.  CHEST:  Breathing symmetric.  No audible wheezing.  ABD: Soft, non-distended.  SKIN:  Warm, pink, dry.  No rash on exposed areas.    EXT:  No cyanosis, " clubbing, or edema.  No color change or changes in nail or hair growth.  NEURO/MUSCULOSKELETAL:  Fully alert, oriented, and appropriate. Speech normal milvia. No cranial nerve deficits.   Gait:  Normal.  No focal motor deficits.           Imaging:      Narrative & Impression    EXAMINATION:  MRI CERVICAL SPINE WITHOUT CONTRAST     CLINICAL HISTORY:  Neck pain, chronic, degenerative changes on xray; Other cervical disc degeneration, unspecified cervical region     TECHNIQUE:  Multiplanar, multisequence MR images of the cervical spine were performed without the administration of contrast.     COMPARISON:  Cervical spine radiograph 03/30/2022     FINDINGS:  C1-C2: Dens is intact.  Pre dens space is maintained.     Alignment: Straightening of the cervical lordosis.  Minimal anterolisthesis C3 on C4.     Vertebrae: No acute fracture or abnormal marrow infiltrative process.  Degenerative endplate changes at C6-C7.     Discs: Multilevel mild-to-moderate disc height loss most significant C6-C7.  Normal signal.     Cord: Normal signal.     Skull base and craniocervical junction: Normal.     Degenerative findings:     C2-C3: Posterior disc osteophyte complex effacing the ventral thecal sac and mild left facet joint arthropathy.  Findings contribute to mild left neural foraminal narrowing.     C3-C4: Posterior disc osteophyte complex abutting or flattening the cord and facet joint arthropathy greater on the left.  Findings contribute to moderate spinal canal stenosis with severe left and moderate right neural foraminal narrowing.     C4-C5: Posterior disc osteophyte complex contributing to moderate spinal canal stenosis and severe bilateral neural foraminal narrowing.     C5-C6: Posterior disc osteophyte complex and mild facet joint arthropathy contributing to moderate spinal canal stenosis and severe bilateral neural foraminal narrowing.     C6-C7: Posterior disc osteophyte complex with mild facet joint arthropathy  contribute to moderate spinal canal stenosis and severe bilateral neural foraminal narrowing.     C7-T1: Posterior disc osteophyte complex contributing to mild bilateral neural foraminal narrowing.     Prominent posterior disc bulges at T1-T2 and T3-T4 contributing to neural foraminal narrowing on the right incompletely evaluated.     Paraspinal muscles & soft tissues: Left thyroid 2.0 cm thyroid nodule.     Impression:     Cervical spondylosis with multilevel moderate spinal canal stenosis and severe neural foraminal narrowing most significant at C3-C4.     2.0 cm left thyroid nodule.  Recommend further evaluation with nonemergent thyroid ultrasound per ACR guidelines.     Electronically signed by resident: Jaylan Garsia  Date:                                            09/01/2022  Time:                                           10:35     Electronically signed by: Benigno Esparza MD  Date:                                            09/01/2022  Time:                                           12:17         Narrative & Impression    EXAMINATION:  XR CERVICAL SPINE COMPLETE 5 VIEW     CLINICAL HISTORY:  . Cervicalgia     TECHNIQUE:  AP, Lateral, bilateral oblique and open mouth views of the cervical spine were performed.     COMPARISON:  06/25/2019     FINDINGS:  Cervical vertebral body heights and alignment are preserved with multilevel spondylitic spurring and disc narrowing at the mid and lower cervical levels.  There is straightening and reversal of the normal cervical lordosis.     Posterior elements and neural foramina intact.     No prevertebral soft tissue swelling     Impression:     Multilevel spondylosis        Electronically signed by: Buck Young Jr  Date:                                            09/10/2021  Time:                                           10:52         Assessment:       Encounter Diagnoses   Name Primary?    Cervical radiculopathy Yes    DDD (degenerative disc disease), cervical      Cervical spondylosis            Plan:       Ignacio was seen today for follow-up.    Diagnoses and all orders for this visit:    Cervical radiculopathy  -     Ambulatory referral/consult to Neurosurgery; Future    DDD (degenerative disc disease), cervical  -     Ambulatory referral/consult to Neurosurgery; Future    Cervical spondylosis  -     Ambulatory referral/consult to Neurosurgery; Future        Ignacio Pelaez Jr. is a 72 y.o. male with acute exacerbation of left greater than right-sided neck.  Likely related it to exacerbation of chronic cervical degenerative disc disease/spondylosis..  Cervical x-rays do show significant multilevel degenerative disc disease.  Has multilevel degenerative disc disease and facet spondylosis noted on cervical MRI.  Good but short-lived relief following cervical epidural steroid injection.    1.  Pertinent imaging studies reviewed by me. Imaging results were discussed with patient.  2.  Continue gabapentin and ibuprofen as needed.    3.  Continue regular home exercise program.    4.  Refer to Neurosurgery to discuss potential surgical options.  5.  May consider repeat epidural steroid injection if no feasible surgical options pursued.  This note was created by combination of typed  and M-Modal dictation. Transcription and phonetic errors may be present.  If there are any questions, please contact me.

## 2022-10-31 ENCOUNTER — OFFICE VISIT (OUTPATIENT)
Dept: NEUROSURGERY | Facility: CLINIC | Age: 72
End: 2022-10-31
Payer: MEDICARE

## 2022-10-31 VITALS
BODY MASS INDEX: 33.58 KG/M2 | SYSTOLIC BLOOD PRESSURE: 122 MMHG | TEMPERATURE: 98 F | DIASTOLIC BLOOD PRESSURE: 84 MMHG | HEIGHT: 74 IN | HEART RATE: 73 BPM

## 2022-10-31 DIAGNOSIS — M50.30 DDD (DEGENERATIVE DISC DISEASE), CERVICAL: ICD-10-CM

## 2022-10-31 DIAGNOSIS — M47.812 CERVICAL SPONDYLOSIS: ICD-10-CM

## 2022-10-31 DIAGNOSIS — M54.12 CERVICAL RADICULOPATHY: ICD-10-CM

## 2022-10-31 PROCEDURE — 99204 OFFICE O/P NEW MOD 45 MIN: CPT | Mod: S$PBB,,, | Performed by: NEUROLOGICAL SURGERY

## 2022-10-31 PROCEDURE — 99999 PR PBB SHADOW E&M-EST. PATIENT-LVL III: CPT | Mod: PBBFAC,,, | Performed by: NEUROLOGICAL SURGERY

## 2022-10-31 PROCEDURE — 99999 PR PBB SHADOW E&M-EST. PATIENT-LVL III: ICD-10-PCS | Mod: PBBFAC,,, | Performed by: NEUROLOGICAL SURGERY

## 2022-10-31 PROCEDURE — 99213 OFFICE O/P EST LOW 20 MIN: CPT | Mod: PBBFAC | Performed by: NEUROLOGICAL SURGERY

## 2022-10-31 PROCEDURE — 99204 PR OFFICE/OUTPT VISIT, NEW, LEVL IV, 45-59 MIN: ICD-10-PCS | Mod: S$PBB,,, | Performed by: NEUROLOGICAL SURGERY

## 2022-10-31 RX ORDER — METHOCARBAMOL 500 MG/1
500 TABLET, FILM COATED ORAL 4 TIMES DAILY
Qty: 40 TABLET | Refills: 0 | Status: SHIPPED | OUTPATIENT
Start: 2022-10-31 | End: 2022-11-10

## 2022-11-01 ENCOUNTER — OFFICE VISIT (OUTPATIENT)
Dept: PAIN MEDICINE | Facility: CLINIC | Age: 72
End: 2022-11-01
Payer: MEDICARE

## 2022-11-01 VITALS
OXYGEN SATURATION: 95 % | DIASTOLIC BLOOD PRESSURE: 82 MMHG | HEIGHT: 74 IN | SYSTOLIC BLOOD PRESSURE: 123 MMHG | HEART RATE: 80 BPM | BODY MASS INDEX: 33.44 KG/M2 | WEIGHT: 260.56 LBS

## 2022-11-01 DIAGNOSIS — M47.812 CERVICAL SPONDYLOSIS: ICD-10-CM

## 2022-11-01 DIAGNOSIS — M54.12 CERVICAL RADICULOPATHY: Primary | ICD-10-CM

## 2022-11-01 DIAGNOSIS — M50.30 DDD (DEGENERATIVE DISC DISEASE), CERVICAL: ICD-10-CM

## 2022-11-01 PROCEDURE — 99213 OFFICE O/P EST LOW 20 MIN: CPT | Mod: PBBFAC,PN | Performed by: PAIN MEDICINE

## 2022-11-01 PROCEDURE — 99999 PR PBB SHADOW E&M-EST. PATIENT-LVL III: CPT | Mod: PBBFAC,,, | Performed by: PAIN MEDICINE

## 2022-11-01 PROCEDURE — 99213 OFFICE O/P EST LOW 20 MIN: CPT | Mod: S$PBB,,, | Performed by: PAIN MEDICINE

## 2022-11-01 PROCEDURE — 99999 PR PBB SHADOW E&M-EST. PATIENT-LVL III: ICD-10-PCS | Mod: PBBFAC,,, | Performed by: PAIN MEDICINE

## 2022-11-01 PROCEDURE — 99213 PR OFFICE/OUTPT VISIT, EST, LEVL III, 20-29 MIN: ICD-10-PCS | Mod: S$PBB,,, | Performed by: PAIN MEDICINE

## 2022-11-01 NOTE — PROGRESS NOTES
Subjective:     Patient ID: Ignacio Pelaez Jr. is a 72 y.o. male    Chief Complaint: Neck Pain (F/U post exam with Neurosurgeon, Dr Manriquez on 10/31/2022. Returning to discuss treatment options vs. Surgery.)      Referred by: No ref. provider found      HPI:    Interval History (11/1/22):  He returns today for follow up.  He reports that he was evaluated by Dr. Manriquez with Neurosurgery yesterday.  She did not feel that surgery was an appropriate option at this time.  Patient continues to have left-sided neck and upper extremity pain as before without any changes in quality or location..  He denies any new worsening symptoms.        Interval History (10/25/22):  He returns today for follow up.  He reports that C7-T1 interlaminar epidural steroid injection has not been helpful for the left-sided neck pain.  Reports 100% relief but for only 5 days.  Otherwise, denies any changes in the quality or location of his pain.  He denies any new or worsening symptoms.      Interval History (10/4/22):  He returns today for follow up.  He reports that his neck pain has returned. He denies any changes in the quality location the pain.  He denies any new or worsening symptoms.      Interval History (9/6/22):  He returns today for follow up and MRI review.  He reports that his pain has completely resolved, but he does continue to have some neck stiffness.  Otherwise, he denies any changes in the quality location of his pain.  He does take ibuprofen and gabapentin as needed.  Finds these medications are helpful and well-tolerated.      Interval History (8/25/22):  He returns today for follow up.  He reports that his neck pain is recurring.  Much more severe this time.  Significantly limited range of motion.  Having some pain radiating to the left shoulder but otherwise no pain in the upper extremities.  Pain does radiate inferiorly along the spine.  Denies any new numbness, tingling, weakness, bowel bladder dysfunction.  Gabapentin has not  been helpful.  Denies any adverse effects from this medication.       Interval History (3/31/22):  He returns today for follow up.  He reports that he is having acute exacerbation of left-sided neck pain.  This started roughly 5 days ago.  No specific inciting events or injuries noted.  The pain is located left lower cervical paraspinal region left upper trapezius.  The pain did not radiate to left upper extremity.  He denies any associated numbness, tingling, weakness, bowel bladder dysfunction.  He went to the emergency department yesterday and received injection for his pain.  He states this significantly helped his pain.  He is also taking gabapentin with some relief.  He denies any adverse effects this medication.        Interval History (3/3/22):  He returns today for follow up.  He reports that he is doing well.  States that his back pain has mostly resolved since last encounter.  Still has some pain when he over exerts himself but this is fairly mild.  He also reports that his neck pain continues to be resolved.  He does not feel that further treatment is needed at this time.  He does continue to take gabapentin on an as-needed basis finds this is helpful when he is in pain.      Interval History (1/18/22):  He returns today for follow up.  He reports that physical therapy has been helpful for the neck pain.  He reports his neck pain is essentially resolved.  He continues to attend physical therapy and performs regular home exercise program.  He does state however that he is having acute low back pain.  This pain started less than 1 week ago.  States he might have pulled muscle.  He denies any pain radiating to the lower extremities.  He denies any associated numbness, tingling, weakness, bowel bladder dysfunction.      Initial Encounter (11/23/21):  Ignacio Pelaez Jr. is a 72 y.o. male who presents today with acute neck pain.  This pain started within the past month.  No specific inciting events or  injuries noted.  At this time, the pain is mostly resolved but still slightly bothersome.  However the time of onset it was very severe and very limiting.  The pain is located the bilateral lower cervical paraspinal regions.  The pain radiated across the upper back did not radiate into the upper extremities.  He denies any associated numbness, tingling, weakness, bowel bladder dysfunction.  He was seen at the urgent care and given an intramuscular injection and started on meloxicam and gabapentin.  Together, these significantly improved his pain.  He still utilizes gabapentin and meloxicam as needed but fairly infrequently..   This pain is described in detail below.    Physical Therapy:  Yes.    Non-pharmacologic Treatment:  Rest helps         TENS?  No    Pain Medications:         Currently taking:  Gabapentin, ibuprofen, Robaxin    Has tried in the past:  Meloxicam    Has not tried:  Opioids, Tylenol, TCAs, SNRIs, topical creams    Blood thinners:  None    Interventional Therapies:    10/13/22 - C7-T1 interlaminar epidural steroid injection - 100% relief but for only 5 days    Relevant Surgeries:  None    Affecting sleep?  No    Affecting daily activities? no    Depressive symptoms? no          SI/HI? No    Work status: Retired    Pain Scores:    Best:       0/10  Worst:     8/10  Usually:   7/10  Today:    0/10    Review of Systems   Constitutional:  Negative for activity change, appetite change, chills, fatigue, fever and unexpected weight change.   HENT:  Negative for hearing loss.    Eyes:  Negative for visual disturbance.   Respiratory:  Negative for chest tightness and shortness of breath.    Cardiovascular:  Negative for chest pain.   Gastrointestinal:  Negative for abdominal pain, constipation, diarrhea, nausea and vomiting.   Genitourinary:  Negative for difficulty urinating.   Musculoskeletal:  Positive for myalgias, neck pain and neck stiffness. Negative for back pain and gait problem.   Skin:  Negative  for rash.   Neurological:  Negative for dizziness, weakness, light-headedness, numbness and headaches.   Psychiatric/Behavioral:  Negative for hallucinations, sleep disturbance and suicidal ideas. The patient is not nervous/anxious.      Past Medical History:   Diagnosis Date    Allergy     Collapse of lung 1971    past history.     GERD (gastroesophageal reflux disease)     Gout attack     Hallux valgus (acquired), unspecified foot     Hyperlipidemia     Hypertension     Irregular heart rhythm     Knee pain, chronic        Past Surgical History:   Procedure Laterality Date    chest tube   1971    chest tube placement from collapsed lung     COLONOSCOPY N/A 01/10/2018    Procedure: COLONOSCOPY;  Surgeon: Buck Ferrer MD;  Location: [facility] ENDO;  Service: Endoscopy;  Laterality: N/A;    EPIDURAL STEROID INJECTION INTO CERVICAL SPINE N/A 10/13/2022    Procedure: Injection-steroid-epidural-cervical---C7-T1;  Surgeon: Delfin Cervantes Jr., MD;  Location: [facility] PAIN MGMT;  Service: Pain Management;  Laterality: N/A;    ESOPHAGOGASTRODUODENOSCOPY N/A 03/25/2019    Procedure: EGD (ESOPHAGOGASTRODUODENOSCOPY);  Surgeon: Buck Ferrer MD;  Location: [facility] ENDO;  Service: Endoscopy;  Laterality: N/A;    Injection-steroid-epidural-cervical---C7-T1 (N/A)  10/13/2022       Social History     Socioeconomic History    Marital status:     Number of children: 1   Occupational History    Occupation: retired from school board    Tobacco Use    Smoking status: Never    Smokeless tobacco: Never   Substance and Sexual Activity    Alcohol use: Yes     Alcohol/week: 1.0 standard drink     Types: 1 Cans of beer per week     Comment: six pack per day & more over the weekend     Drug use: No    Sexual activity: Yes     Partners: Female     Birth control/protection: None       Review of patient's allergies indicates:  No Known Allergies    Current Outpatient Medications on File Prior to Visit   Medication Sig Dispense Refill     allopurinoL (ZYLOPRIM) 100 MG tablet Take 1 tablet (100 mg total) by mouth once daily. 90 tablet 0    amLODIPine (NORVASC) 10 MG tablet Take 1 tablet (10 mg total) by mouth once daily. 90 tablet 0    cetirizine (ZYRTEC) 10 MG tablet Take 1 tablet (10 mg total) by mouth once daily. 15 tablet 0    colchicine (COLCRYS) 0.6 mg tablet Take 1 tablet (0.6 mg total) by mouth once daily. Take 2 tablets at once then 1 tablet 1 hour later. Continue to take 1 pill daily until resolution of symptoms. 30 tablet 0    ergocalciferol (VITAMIN D2) 50,000 unit Cap Take 1 capsule (50,000 Units total) by mouth every 30 days. 12 capsule 0    FLUZONE HIGHDOSE QUAD 20-21  mcg/0.7 mL Syrg ADM 0.7ML IM UTD      gabapentin (NEURONTIN) 100 MG capsule Take 100 mg by mouth 3 (three) times daily.      lisinopriL 10 MG tablet Take 1 tablet (10 mg total) by mouth once daily. 90 tablet 0    methocarbamoL (ROBAXIN) 500 MG Tab Take 1 tablet (500 mg total) by mouth 4 (four) times daily. for 10 days 40 tablet 0    pantoprazole (PROTONIX) 40 MG tablet Take 1 tablet (40 mg total) by mouth once daily. 90 tablet 0    simvastatin (ZOCOR) 10 MG tablet Take 1 tablet (10 mg total) by mouth every evening. 90 tablet 0     Current Facility-Administered Medications on File Prior to Visit   Medication Dose Route Frequency Provider Last Rate Last Admin    ALPRAZolam dissolvable tablet 1 mg  1 mg Oral Once PRN Delfin Cervantes Jr., MD        dexAMETHasone sodium phos (PF) injection 10 mg  10 mg Epidural Once Delfin Cervantes Jr., MD        iohexoL (OMNIPAQUE 240) injection 5 mL  5 mL Intravenous ONCE PRN Delfin Cervantes Jr., MD        LIDOcaine (PF) 10 mg/ml (1%) injection 10 mg  1 mL Other Once Delfin Cervantes Jr., MD        LIDOcaine (PF) 20 mg/mL (2%) injection 200 mg  10 mL Other Once Delfin Cervantes Jr., MD        triamcinolone acetonide injection 40 mg  40 mg Intra-articular 1 time in Clinic/HOD Luis Miguel Chi MD      "      Objective:      /82 (BP Location: Left arm, Patient Position: Sitting, BP Method: Large (Automatic))   Pulse 80   Ht 6' 2" (1.88 m)   Wt 118.2 kg (260 lb 9.3 oz)   SpO2 95%   BMI 33.46 kg/m²     Exam:  GEN:  Well developed, well nourished.  No acute distress.   HEENT:  No trauma.  Mucous membranes moist.  Nares patent bilaterally.  PSYCH: Normal affect. Thought content appropriate.  CHEST:  Breathing symmetric.  No audible wheezing.  ABD: Soft, non-distended.  SKIN:  Warm, pink, dry.  No rash on exposed areas.    EXT:  No cyanosis, clubbing, or edema.  No color change or changes in nail or hair growth.  NEURO/MUSCULOSKELETAL:  Fully alert, oriented, and appropriate. Speech normal milvia. No cranial nerve deficits.   Gait:  Normal.  No focal motor deficits.           Imaging:      Narrative & Impression    EXAMINATION:  MRI CERVICAL SPINE WITHOUT CONTRAST     CLINICAL HISTORY:  Neck pain, chronic, degenerative changes on xray; Other cervical disc degeneration, unspecified cervical region     TECHNIQUE:  Multiplanar, multisequence MR images of the cervical spine were performed without the administration of contrast.     COMPARISON:  Cervical spine radiograph 03/30/2022     FINDINGS:  C1-C2: Dens is intact.  Pre dens space is maintained.     Alignment: Straightening of the cervical lordosis.  Minimal anterolisthesis C3 on C4.     Vertebrae: No acute fracture or abnormal marrow infiltrative process.  Degenerative endplate changes at C6-C7.     Discs: Multilevel mild-to-moderate disc height loss most significant C6-C7.  Normal signal.     Cord: Normal signal.     Skull base and craniocervical junction: Normal.     Degenerative findings:     C2-C3: Posterior disc osteophyte complex effacing the ventral thecal sac and mild left facet joint arthropathy.  Findings contribute to mild left neural foraminal narrowing.     C3-C4: Posterior disc osteophyte complex abutting or flattening the cord and facet joint " arthropathy greater on the left.  Findings contribute to moderate spinal canal stenosis with severe left and moderate right neural foraminal narrowing.     C4-C5: Posterior disc osteophyte complex contributing to moderate spinal canal stenosis and severe bilateral neural foraminal narrowing.     C5-C6: Posterior disc osteophyte complex and mild facet joint arthropathy contributing to moderate spinal canal stenosis and severe bilateral neural foraminal narrowing.     C6-C7: Posterior disc osteophyte complex with mild facet joint arthropathy contribute to moderate spinal canal stenosis and severe bilateral neural foraminal narrowing.     C7-T1: Posterior disc osteophyte complex contributing to mild bilateral neural foraminal narrowing.     Prominent posterior disc bulges at T1-T2 and T3-T4 contributing to neural foraminal narrowing on the right incompletely evaluated.     Paraspinal muscles & soft tissues: Left thyroid 2.0 cm thyroid nodule.     Impression:     Cervical spondylosis with multilevel moderate spinal canal stenosis and severe neural foraminal narrowing most significant at C3-C4.     2.0 cm left thyroid nodule.  Recommend further evaluation with nonemergent thyroid ultrasound per ACR guidelines.     Electronically signed by resident: Jaylan Garsia  Date:                                            09/01/2022  Time:                                           10:35     Electronically signed by: Benigno Esparza MD  Date:                                            09/01/2022  Time:                                           12:17         Narrative & Impression    EXAMINATION:  XR CERVICAL SPINE COMPLETE 5 VIEW     CLINICAL HISTORY:  . Cervicalgia     TECHNIQUE:  AP, Lateral, bilateral oblique and open mouth views of the cervical spine were performed.     COMPARISON:  06/25/2019     FINDINGS:  Cervical vertebral body heights and alignment are preserved with multilevel spondylitic spurring and disc narrowing at the  mid and lower cervical levels.  There is straightening and reversal of the normal cervical lordosis.     Posterior elements and neural foramina intact.     No prevertebral soft tissue swelling     Impression:     Multilevel spondylosis        Electronically signed by: Buck Young   Date:                                            09/10/2021  Time:                                           10:52         Assessment:       Encounter Diagnoses   Name Primary?    Cervical radiculopathy Yes    DDD (degenerative disc disease), cervical     Cervical spondylosis              Plan:       Ignacio was seen today for neck pain.    Diagnoses and all orders for this visit:    Cervical radiculopathy    DDD (degenerative disc disease), cervical    Cervical spondylosis          Ignacio Pelaez Jr. is a 72 y.o. male with acute exacerbation of left greater than right-sided neck.  Likely related it to exacerbation of chronic cervical degenerative disc disease/spondylosis..  Cervical x-rays do show significant multilevel degenerative disc disease.  Has multilevel degenerative disc disease and facet spondylosis noted on cervical MRI.  Good but short-lived relief following cervical epidural steroid injection.  No surgery proposed by Neurosurgery team.    1.  Pertinent imaging studies reviewed by me. Imaging results were discussed with patient.  2.  Continue gabapentin, Robaxin and ibuprofen as needed.    3.  Continue regular home exercise program.    4.  Schedule for C7-T1 interlaminar epidural steroid injection.  5.  Return to clinic after procedure to discuss results.  May consider left cervical medial branch blocks/RFA pain persists.      This note was created by combination of typed  and M-Modal dictation. Transcription and phonetic errors may be present.  If there are any questions, please contact me.

## 2022-11-29 ENCOUNTER — OFFICE VISIT (OUTPATIENT)
Dept: PAIN MEDICINE | Facility: CLINIC | Age: 72
End: 2022-11-29
Payer: MEDICARE

## 2022-11-29 DIAGNOSIS — M54.12 CERVICAL RADICULOPATHY: Primary | ICD-10-CM

## 2022-11-29 DIAGNOSIS — M50.30 DDD (DEGENERATIVE DISC DISEASE), CERVICAL: ICD-10-CM

## 2022-11-29 DIAGNOSIS — M47.812 CERVICAL SPONDYLOSIS: ICD-10-CM

## 2022-11-29 PROCEDURE — 99999 PR PBB SHADOW E&M-EST. PATIENT-LVL II: ICD-10-PCS | Mod: PBBFAC,,, | Performed by: PAIN MEDICINE

## 2022-11-29 PROCEDURE — 99212 OFFICE O/P EST SF 10 MIN: CPT | Mod: PBBFAC,PN | Performed by: PAIN MEDICINE

## 2022-11-29 PROCEDURE — 99214 PR OFFICE/OUTPT VISIT, EST, LEVL IV, 30-39 MIN: ICD-10-PCS | Mod: S$PBB,,, | Performed by: PAIN MEDICINE

## 2022-11-29 PROCEDURE — 99999 PR PBB SHADOW E&M-EST. PATIENT-LVL II: CPT | Mod: PBBFAC,,, | Performed by: PAIN MEDICINE

## 2022-11-29 PROCEDURE — 99214 OFFICE O/P EST MOD 30 MIN: CPT | Mod: S$PBB,,, | Performed by: PAIN MEDICINE

## 2022-11-29 NOTE — PROGRESS NOTES
Subjective:     Patient ID: Ignacoi Pelaez Jr. is a 72 y.o. male    Chief Complaint: Follow-up (S/P CHENCHO; 11/10)      Referred by: No ref. provider found      HPI:    Interval History (11/29/22):  He returns today for follow up.  He reports that C7-T1 interlaminar epidural steroid injection has been helpful for the left-sided neck and upper extremity pain.  He reports 100% relief.  He is very happy with these results and does not feel that further treatment is needed at this time.      Interval History (11/1/22):  He returns today for follow up.  He reports that he was evaluated by Dr. Manriquez with Neurosurgery yesterday.  She did not feel that surgery was an appropriate option at this time.  Patient continues to have left-sided neck and upper extremity pain as before without any changes in quality or location..  He denies any new worsening symptoms.        Interval History (10/25/22):  He returns today for follow up.  He reports that C7-T1 interlaminar epidural steroid injection has not been helpful for the left-sided neck pain.  Reports 100% relief but for only 5 days.  Otherwise, denies any changes in the quality or location of his pain.  He denies any new or worsening symptoms.      Interval History (10/4/22):  He returns today for follow up.  He reports that his neck pain has returned. He denies any changes in the quality location the pain.  He denies any new or worsening symptoms.      Interval History (9/6/22):  He returns today for follow up and MRI review.  He reports that his pain has completely resolved, but he does continue to have some neck stiffness.  Otherwise, he denies any changes in the quality location of his pain.  He does take ibuprofen and gabapentin as needed.  Finds these medications are helpful and well-tolerated.      Interval History (8/25/22):  He returns today for follow up.  He reports that his neck pain is recurring.  Much more severe this time.  Significantly limited range of motion.   Having some pain radiating to the left shoulder but otherwise no pain in the upper extremities.  Pain does radiate inferiorly along the spine.  Denies any new numbness, tingling, weakness, bowel bladder dysfunction.  Gabapentin has not been helpful.  Denies any adverse effects from this medication.       Interval History (3/31/22):  He returns today for follow up.  He reports that he is having acute exacerbation of left-sided neck pain.  This started roughly 5 days ago.  No specific inciting events or injuries noted.  The pain is located left lower cervical paraspinal region left upper trapezius.  The pain did not radiate to left upper extremity.  He denies any associated numbness, tingling, weakness, bowel bladder dysfunction.  He went to the emergency department yesterday and received injection for his pain.  He states this significantly helped his pain.  He is also taking gabapentin with some relief.  He denies any adverse effects this medication.        Interval History (3/3/22):  He returns today for follow up.  He reports that he is doing well.  States that his back pain has mostly resolved since last encounter.  Still has some pain when he over exerts himself but this is fairly mild.  He also reports that his neck pain continues to be resolved.  He does not feel that further treatment is needed at this time.  He does continue to take gabapentin on an as-needed basis finds this is helpful when he is in pain.      Interval History (1/18/22):  He returns today for follow up.  He reports that physical therapy has been helpful for the neck pain.  He reports his neck pain is essentially resolved.  He continues to attend physical therapy and performs regular home exercise program.  He does state however that he is having acute low back pain.  This pain started less than 1 week ago.  States he might have pulled muscle.  He denies any pain radiating to the lower extremities.  He denies any associated numbness,  tingling, weakness, bowel bladder dysfunction.      Initial Encounter (11/23/21):  Ignacio Pealez Jr. is a 72 y.o. male who presents today with acute neck pain.  This pain started within the past month.  No specific inciting events or injuries noted.  At this time, the pain is mostly resolved but still slightly bothersome.  However the time of onset it was very severe and very limiting.  The pain is located the bilateral lower cervical paraspinal regions.  The pain radiated across the upper back did not radiate into the upper extremities.  He denies any associated numbness, tingling, weakness, bowel bladder dysfunction.  He was seen at the urgent care and given an intramuscular injection and started on meloxicam and gabapentin.  Together, these significantly improved his pain.  He still utilizes gabapentin and meloxicam as needed but fairly infrequently..   This pain is described in detail below.    Physical Therapy:  Yes.    Non-pharmacologic Treatment:  Rest helps         TENS?  No    Pain Medications:         Currently taking:  Gabapentin, ibuprofen, Robaxin    Has tried in the past:  Meloxicam    Has not tried:  Opioids, Tylenol, TCAs, SNRIs, topical creams    Blood thinners:  None    Interventional Therapies:    10/13/22 - C7-T1 interlaminar epidural steroid injection - 100% relief but for only 5 days  11/10/22 - C7-T1 interlaminar epidural steroid injection - 100% relief    Relevant Surgeries:  None    Affecting sleep?  No    Affecting daily activities? no    Depressive symptoms? no          SI/HI? No    Work status: Retired    Pain Scores:    Best:       0/10  Worst:     0/10  Usually:   0/10  Today:    0/10    Review of Systems   Constitutional:  Negative for activity change, appetite change, chills, fatigue, fever and unexpected weight change.   HENT:  Negative for hearing loss.    Eyes:  Negative for visual disturbance.   Respiratory:  Negative for chest tightness and shortness of breath.    Cardiovascular:   Negative for chest pain.   Gastrointestinal:  Negative for abdominal pain, constipation, diarrhea, nausea and vomiting.   Genitourinary:  Negative for difficulty urinating.   Musculoskeletal:  Negative for back pain, gait problem, myalgias, neck pain and neck stiffness.   Skin:  Negative for rash.   Neurological:  Negative for dizziness, weakness, light-headedness, numbness and headaches.   Psychiatric/Behavioral:  Negative for hallucinations, sleep disturbance and suicidal ideas. The patient is not nervous/anxious.      Past Medical History:   Diagnosis Date    Allergy     Collapse of lung 1971    past history.     GERD (gastroesophageal reflux disease)     Gout attack     Hallux valgus (acquired), unspecified foot     High cholesterol     Hyperlipidemia     Hypertension     Irregular heart rhythm     Knee pain, chronic        Past Surgical History:   Procedure Laterality Date    chest tube   1971    chest tube placement from collapsed lung     COLONOSCOPY N/A 01/10/2018    Procedure: COLONOSCOPY;  Surgeon: Buck Ferrer MD;  Location: [facility];  Service: Endoscopy;  Laterality: N/A;    EPIDURAL STEROID INJECTION INTO CERVICAL SPINE N/A 10/13/2022    Procedure: Injection-steroid-epidural-cervical---C7-T1;  Surgeon: Delfin Cervantes Jr., MD;  Location: [facility];  Service: Pain Management;  Laterality: N/A;    EPIDURAL STEROID INJECTION INTO CERVICAL SPINE N/A 11/10/2022    Procedure: Injection-steroid-epidural-cervical---C7-T1;  Surgeon: Delfin Cervantes Jr., MD;  Location: [facility];  Service: Pain Management;  Laterality: N/A;    ESOPHAGOGASTRODUODENOSCOPY N/A 03/25/2019    Procedure: EGD (ESOPHAGOGASTRODUODENOSCOPY);  Surgeon: Buck Ferrer MD;  Location: [facility];  Service: Endoscopy;  Laterality: N/A;    Injection-steroid-epidural-cervical---C7-T1 (N/A)  10/13/2022       Social History     Socioeconomic History    Marital status:     Number of children: 1   Occupational History     Occupation: retired from school board    Tobacco Use    Smoking status: Never    Smokeless tobacco: Never   Substance and Sexual Activity    Alcohol use: Yes     Alcohol/week: 1.0 standard drink     Types: 1 Cans of beer per week     Comment: six pack per day & more over the weekend     Drug use: No    Sexual activity: Yes     Partners: Female     Birth control/protection: None       Review of patient's allergies indicates:  No Known Allergies    Current Outpatient Medications on File Prior to Visit   Medication Sig Dispense Refill    allopurinoL (ZYLOPRIM) 100 MG tablet Take 1 tablet (100 mg total) by mouth once daily. 90 tablet 0    amLODIPine (NORVASC) 10 MG tablet Take 1 tablet (10 mg total) by mouth once daily. 90 tablet 0    cetirizine (ZYRTEC) 10 MG tablet Take 1 tablet (10 mg total) by mouth once daily. 15 tablet 0    colchicine (COLCRYS) 0.6 mg tablet Take 1 tablet (0.6 mg total) by mouth once daily. Take 2 tablets at once then 1 tablet 1 hour later. Continue to take 1 pill daily until resolution of symptoms. 30 tablet 0    ergocalciferol (VITAMIN D2) 50,000 unit Cap Take 1 capsule (50,000 Units total) by mouth every 30 days. 12 capsule 0    FLUZONE HIGHDOSE QUAD 20-21  mcg/0.7 mL Syrg ADM 0.7ML IM UTD      gabapentin (NEURONTIN) 100 MG capsule Take 100 mg by mouth 3 (three) times daily.      lisinopriL 10 MG tablet Take 1 tablet (10 mg total) by mouth once daily. 90 tablet 0    pantoprazole (PROTONIX) 40 MG tablet Take 1 tablet (40 mg total) by mouth once daily. 90 tablet 0    simvastatin (ZOCOR) 10 MG tablet TAKE 1 TABLET(10 MG) BY MOUTH EVERY EVENING 90 tablet 0     Current Facility-Administered Medications on File Prior to Visit   Medication Dose Route Frequency Provider Last Rate Last Admin    ALPRAZolam dissolvable tablet 1 mg  1 mg Oral Once PRN Delfin Cervantes Jr., MD        dexAMETHasone sodium phos (PF) injection 10 mg  10 mg Epidural Once Delfin Cervantes Jr., MD        iohexoL  (OMNIPAQUE 240) injection 5 mL  5 mL Intravenous ONCE PRN Delfin Cervantes Jr., MD        LIDOcaine (PF) 10 mg/ml (1%) injection 10 mg  1 mL Other Once Delfin Cervantes Jr., MD        LIDOcaine (PF) 20 mg/mL (2%) injection 200 mg  10 mL Other Once Delfin Cervantes Jr., MD        triamcinolone acetonide injection 40 mg  40 mg Intra-articular 1 time in Clinic/HOD Luis Miguel Chi MD           Objective:      There were no vitals taken for this visit.    Exam:  GEN:  Well developed, well nourished.  No acute distress.   HEENT:  No trauma.  Mucous membranes moist.  Nares patent bilaterally.  PSYCH: Normal affect. Thought content appropriate.  CHEST:  Breathing symmetric.  No audible wheezing.  ABD: Soft, non-distended.  SKIN:  Warm, pink, dry.  No rash on exposed areas.    EXT:  No cyanosis, clubbing, or edema.  No color change or changes in nail or hair growth.  NEURO/MUSCULOSKELETAL:  Fully alert, oriented, and appropriate. Speech normal milvia. No cranial nerve deficits.   Gait:  Normal.  No focal motor deficits.           Imaging:      Narrative & Impression    EXAMINATION:  MRI CERVICAL SPINE WITHOUT CONTRAST     CLINICAL HISTORY:  Neck pain, chronic, degenerative changes on xray; Other cervical disc degeneration, unspecified cervical region     TECHNIQUE:  Multiplanar, multisequence MR images of the cervical spine were performed without the administration of contrast.     COMPARISON:  Cervical spine radiograph 03/30/2022     FINDINGS:  C1-C2: Dens is intact.  Pre dens space is maintained.     Alignment: Straightening of the cervical lordosis.  Minimal anterolisthesis C3 on C4.     Vertebrae: No acute fracture or abnormal marrow infiltrative process.  Degenerative endplate changes at C6-C7.     Discs: Multilevel mild-to-moderate disc height loss most significant C6-C7.  Normal signal.     Cord: Normal signal.     Skull base and craniocervical junction: Normal.     Degenerative findings:     C2-C3:  Posterior disc osteophyte complex effacing the ventral thecal sac and mild left facet joint arthropathy.  Findings contribute to mild left neural foraminal narrowing.     C3-C4: Posterior disc osteophyte complex abutting or flattening the cord and facet joint arthropathy greater on the left.  Findings contribute to moderate spinal canal stenosis with severe left and moderate right neural foraminal narrowing.     C4-C5: Posterior disc osteophyte complex contributing to moderate spinal canal stenosis and severe bilateral neural foraminal narrowing.     C5-C6: Posterior disc osteophyte complex and mild facet joint arthropathy contributing to moderate spinal canal stenosis and severe bilateral neural foraminal narrowing.     C6-C7: Posterior disc osteophyte complex with mild facet joint arthropathy contribute to moderate spinal canal stenosis and severe bilateral neural foraminal narrowing.     C7-T1: Posterior disc osteophyte complex contributing to mild bilateral neural foraminal narrowing.     Prominent posterior disc bulges at T1-T2 and T3-T4 contributing to neural foraminal narrowing on the right incompletely evaluated.     Paraspinal muscles & soft tissues: Left thyroid 2.0 cm thyroid nodule.     Impression:     Cervical spondylosis with multilevel moderate spinal canal stenosis and severe neural foraminal narrowing most significant at C3-C4.     2.0 cm left thyroid nodule.  Recommend further evaluation with nonemergent thyroid ultrasound per ACR guidelines.     Electronically signed by resident: Jaylan Garsia  Date:                                            09/01/2022  Time:                                           10:35     Electronically signed by: Benigno Esparza MD  Date:                                            09/01/2022  Time:                                           12:17         Narrative & Impression    EXAMINATION:  XR CERVICAL SPINE COMPLETE 5 VIEW     CLINICAL HISTORY:  . Cervicalgia      TECHNIQUE:  AP, Lateral, bilateral oblique and open mouth views of the cervical spine were performed.     COMPARISON:  06/25/2019     FINDINGS:  Cervical vertebral body heights and alignment are preserved with multilevel spondylitic spurring and disc narrowing at the mid and lower cervical levels.  There is straightening and reversal of the normal cervical lordosis.     Posterior elements and neural foramina intact.     No prevertebral soft tissue swelling     Impression:     Multilevel spondylosis        Electronically signed by: Buck Young Jr  Date:                                            09/10/2021  Time:                                           10:52         Assessment:       Encounter Diagnoses   Name Primary?    Cervical radiculopathy Yes    Cervical spondylosis     DDD (degenerative disc disease), cervical                Plan:       Ignacio was seen today for follow-up.    Diagnoses and all orders for this visit:    Cervical radiculopathy    Cervical spondylosis    DDD (degenerative disc disease), cervical            Ignacio Pelaez Jr. is a 72 y.o. male with acute exacerbation of left greater than right-sided neck.  Likely related it to exacerbation of chronic cervical degenerative disc disease/spondylosis..  Cervical x-rays do show significant multilevel degenerative disc disease.  Has multilevel degenerative disc disease and facet spondylosis noted on cervical MRI.  Good but short-lived relief following cervical epidural steroid injection.  No surgery proposed by Neurosurgery team.  Repeat epidural steroid injection has provided 2 weeks of 100% relief so far.    1.  Pertinent imaging studies reviewed by me. Imaging results were discussed with patient.  2.  Continue gabapentin, Robaxin and ibuprofen as needed.    3.  Continue regular home exercise program.    4.  Return to clinic as needed.  May consider repeat epidural steroid injection in the future if appropriate.      This note was created by  combination of typed  and M-Modal dictation. Transcription and phonetic errors may be present.  If there are any questions, please contact me.

## 2022-11-30 NOTE — PROGRESS NOTES
Neurosurgery  History & Physical    SUBJECTIVE:     Chief Complaint:  Neck pain and left shoulder and arm pain.    History of Present Illness:  Mr. Pelaez is a 72-year-old male who was referred to me by Dr. Delfin Cervantes.  His past medical history is significant for allergy, GERD, gout, hypercholesterolemia, hyperlipidemia, hypertension, and chronic knee pain.  He complains of an approximately 1 year history of neck pain with left shoulder and arm pain.  This began suddenly without any inciting factor.  Lying down and sitting makes the pain worse.  Standing makes the pain better.  He denies any significant numbness or tingling in his upper extremities.  He denies clumsiness of his hands.  He denies gait difficulty or weakness of his arms or legs.  He denies any bowel or bladder difficulty.  Underwent a C7-T1 interlaminar epidural steroid injection in October 2022.  This relieved his neck pain and arm pain for approximately 5 days.  However, the pain then returned.    Review of patient's allergies indicates:  No Known Allergies    Current Outpatient Medications   Medication Sig Dispense Refill    allopurinoL (ZYLOPRIM) 100 MG tablet Take 1 tablet (100 mg total) by mouth once daily. 90 tablet 0    amLODIPine (NORVASC) 10 MG tablet Take 1 tablet (10 mg total) by mouth once daily. 90 tablet 0    cetirizine (ZYRTEC) 10 MG tablet Take 1 tablet (10 mg total) by mouth once daily. 15 tablet 0    colchicine (COLCRYS) 0.6 mg tablet Take 1 tablet (0.6 mg total) by mouth once daily. Take 2 tablets at once then 1 tablet 1 hour later. Continue to take 1 pill daily until resolution of symptoms. 30 tablet 0    ergocalciferol (VITAMIN D2) 50,000 unit Cap Take 1 capsule (50,000 Units total) by mouth every 30 days. 12 capsule 0    FLUZONE HIGHDOSE QUAD 20-21  mcg/0.7 mL Syrg ADM 0.7ML IM UTD      gabapentin (NEURONTIN) 100 MG capsule Take 100 mg by mouth 3 (three) times daily.      lisinopriL 10 MG tablet Take 1 tablet (10 mg  total) by mouth once daily. 90 tablet 0    pantoprazole (PROTONIX) 40 MG tablet Take 1 tablet (40 mg total) by mouth once daily. 90 tablet 0    simvastatin (ZOCOR) 10 MG tablet TAKE 1 TABLET(10 MG) BY MOUTH EVERY EVENING 90 tablet 0     Current Facility-Administered Medications   Medication Dose Route Frequency Provider Last Rate Last Admin    triamcinolone acetonide injection 40 mg  40 mg Intra-articular 1 time in Clinic/HOD Luis Miguel Chi MD         Facility-Administered Medications Ordered in Other Visits   Medication Dose Route Frequency Provider Last Rate Last Admin    ALPRAZolam dissolvable tablet 1 mg  1 mg Oral Once PRN Delfin Cervantes Jr., MD        dexAMETHasone sodium phos (PF) injection 10 mg  10 mg Epidural Once Delfin Cervantes Jr., MD        iohexoL (OMNIPAQUE 240) injection 5 mL  5 mL Intravenous ONCE PRN Delfin Cervantes Jr., MD        LIDOcaine (PF) 10 mg/ml (1%) injection 10 mg  1 mL Other Once Delfin Cervantes Jr., MD        LIDOcaine (PF) 20 mg/mL (2%) injection 200 mg  10 mL Other Once Delfin Cervantes Jr., MD           Past Medical History:   Diagnosis Date    Allergy     Collapse of lung 1971    past history.     GERD (gastroesophageal reflux disease)     Gout attack     Hallux valgus (acquired), unspecified foot     High cholesterol     Hyperlipidemia     Hypertension     Irregular heart rhythm     Knee pain, chronic      Past Surgical History:   Procedure Laterality Date    chest tube   1971    chest tube placement from collapsed lung     COLONOSCOPY N/A 01/10/2018    Procedure: COLONOSCOPY;  Surgeon: Buck Ferrer MD;  Location: [facility] ENDO;  Service: Endoscopy;  Laterality: N/A;    EPIDURAL STEROID INJECTION INTO CERVICAL SPINE N/A 10/13/2022    Procedure: Injection-steroid-epidural-cervical---C7-T1;  Surgeon: Delfin Cervantes Jr., MD;  Location: [facility] PAIN MGMT;  Service: Pain Management;  Laterality: N/A;    EPIDURAL STEROID INJECTION INTO CERVICAL SPINE N/A  11/10/2022    Procedure: Injection-steroid-epidural-cervical---C7-T1;  Surgeon: Delfin Cervantes Jr., MD;  Location: [facility] PAIN MGMT;  Service: Pain Management;  Laterality: N/A;    ESOPHAGOGASTRODUODENOSCOPY N/A 03/25/2019    Procedure: EGD (ESOPHAGOGASTRODUODENOSCOPY);  Surgeon: Buck Ferrer MD;  Location: [facility] ENDO;  Service: Endoscopy;  Laterality: N/A;    Injection-steroid-epidural-cervical---C7-T1 (N/A)  10/13/2022     Family History       Problem Relation (Age of Onset)    Cancer Sister          Social History     Socioeconomic History    Marital status:     Number of children: 1   Occupational History    Occupation: retired from school board    Tobacco Use    Smoking status: Never    Smokeless tobacco: Never   Substance and Sexual Activity    Alcohol use: Yes     Alcohol/week: 1.0 standard drink     Types: 1 Cans of beer per week     Comment: six pack per day & more over the weekend     Drug use: No    Sexual activity: Yes     Partners: Female     Birth control/protection: None       Review of Systems   Constitutional:  Positive for unexpected weight change. Negative for activity change, diaphoresis, fatigue and fever.   HENT:  Negative for hearing loss, nosebleeds, tinnitus and trouble swallowing.    Eyes:  Positive for visual disturbance.   Respiratory:  Positive for shortness of breath. Negative for cough and wheezing.    Cardiovascular:  Negative for chest pain and palpitations.   Gastrointestinal:  Negative for abdominal distention, abdominal pain, blood in stool, constipation, diarrhea, nausea and vomiting.   Endocrine: Negative for cold intolerance and heat intolerance.   Genitourinary:  Negative for difficulty urinating, enuresis, frequency and urgency.   Musculoskeletal:  Positive for neck pain. Negative for back pain, gait problem, joint swelling, myalgias and neck stiffness.   Skin:  Negative for color change, rash and wound.   Allergic/Immunologic: Negative for environmental allergies  "and food allergies.   Neurological:  Positive for numbness and headaches. Negative for dizziness, seizures, facial asymmetry, speech difficulty, weakness and light-headedness.   Hematological:  Does not bruise/bleed easily.   Psychiatric/Behavioral:  Negative for agitation, behavioral problems, dysphoric mood and hallucinations. The patient is not nervous/anxious.      OBJECTIVE:     Vital Signs  Temp: 97.6 °F (36.4 °C)  Pulse: 73  BP: 122/84  Pain Score:   4  Height: 6' 2" (188 cm)  Body mass index is 33.58 kg/m².      Physical Exam:  Vitals reviewed.    Constitutional: He appears well-developed and well-nourished. No distress.     Eyes: Pupils are equal, round, and reactive to light. Conjunctivae and EOM are normal.     Cardiovascular: Normal rate, regular rhythm, normal pulses and no edema.     Abdominal: Soft. Bowel sounds are normal.     Skin: Skin displays no rash on trunk and no rash on extremities. Skin displays no lesions on trunk and no lesions on extremities.     Psych/Behavior: He is alert. He is oriented to person, place, and time. He has a normal mood and affect.     Musculoskeletal: Gait is normal.        Neck: Range of motion is full. There is no tenderness. Muscle strength is 5/5. Tone is normal.        Back: Range of motion is full. There is no tenderness. Muscle strength is 5/5. Tone is normal.        Right Upper Extremities: Range of motion is full. There is no tenderness. Muscle strength is 5/5. Tone is normal.        Left Upper Extremities: Range of motion is full. There is no tenderness. Muscle strength is 5/5. Tone is normal.       Right Lower Extremities: Range of motion is full. There is no tenderness. Muscle strength is 5/5. Tone is normal.        Left Lower Extremities: Range of motion is full. There is no tenderness. Muscle strength is 5/5. Tone is normal.     Neurological:        Coordination: He has a normal Romberg Test, normal finger to nose coordination and normal tandem walking " coordination.        Sensory: There is no sensory deficit in the trunk. There is no sensory deficit in the extremities.        DTRs: DTRs are DTRS NORMAL AND SYMMETRICnormal and symmetric. He displays no Babinski's sign on the right side. He displays no Babinski's sign on the left side.        Cranial nerves: Cranial nerve(s) II, III, IV, V, VI, VII, VIII, IX, X, XI and XII are intact.       Diagnostic Results:  He has an MRI of the cervical spine available for review which I personally reviewed.  This shows straightening of the normal cervical lordosis.  At C3-4, C4-5, C5-6, and C6-7 there are broad-based disc bulges with mild-to-moderate central canal stenosis but no significant central canal narrowing.  There is some neural foraminal narrowing throughout the cervical spine.    ASSESSMENT/PLAN:     Mr. Pelaez is a 72-year-old male with a 1 year history of neck pain, left shoulder pain, and left arm pain as described above.  Denies any paresthesias or weakness of the left arm or hand.  He did do well with a C7-T1 interlaminar epidural steroid injection but this was short lasting.  His imaging studies are as described above.  While he does have multilevel cervical spondylosis and some central canal stenosis, his symptoms are not consistent with myelopathy.  Furthermore, there is no single level neural foraminal narrowing to a count 4 his left arm symptoms.  Therefore, I would recommend against surgical intervention at this time as I do not feel that it would be helpful for the patient.  I have encouraged him to follow up with Dr. Cervantes of pain management for additional steroid injections versus medial branch blocks versus radiofrequency ablations.  At this point, I will see the patient on an as-needed basis.  He knows he can call with any further questions or concerns in the meantime.        Note dictated with voice recognition software, please excuse any grammatical errors.

## 2024-11-27 ENCOUNTER — TELEPHONE (OUTPATIENT)
Dept: ORTHOPEDICS | Facility: CLINIC | Age: 74
End: 2024-11-27
Payer: MEDICARE

## 2024-11-27 NOTE — TELEPHONE ENCOUNTER
----- Message from Dianna sent at 11/27/2024  3:09 PM CST -----  Hello,    Patient is being referred for Primary osteoarthritis of left knee.  Patient was previously seen by Dr. Garcia Iqbal. Please contact patient to schedule.    Thanks   Discharged

## 2025-06-25 ENCOUNTER — OFFICE VISIT (OUTPATIENT)
Dept: ORTHOPEDICS | Facility: CLINIC | Age: 75
End: 2025-06-25
Payer: MEDICARE

## 2025-06-25 VITALS
SYSTOLIC BLOOD PRESSURE: 135 MMHG | WEIGHT: 270.38 LBS | BODY MASS INDEX: 36.67 KG/M2 | DIASTOLIC BLOOD PRESSURE: 87 MMHG | HEART RATE: 67 BPM

## 2025-06-25 DIAGNOSIS — M17.12 PRIMARY OSTEOARTHRITIS OF LEFT KNEE: Primary | ICD-10-CM

## 2025-06-25 PROCEDURE — 99999 PR PBB SHADOW E&M-EST. PATIENT-LVL IV: CPT | Mod: PBBFAC,,,

## 2025-06-25 PROCEDURE — 99214 OFFICE O/P EST MOD 30 MIN: CPT | Mod: PBBFAC,PN

## 2025-06-25 PROCEDURE — 99203 OFFICE O/P NEW LOW 30 MIN: CPT | Mod: S$PBB,,,

## 2025-06-25 NOTE — PROGRESS NOTES
Patient ID: Ignacio Pelaez Jr. is a 74 y.o. male    Pain of the Left Knee    History of Present Illness:    Ignacio Pelaez Jr. presents to clinic for left knee pain. Notes a fall a few years without pain before the fall. Notes crepitus. The pain started 2 years ago and is becoming progressively worse.  Pain is located over (points to) medial and lateral knee. He reports that the pain is a 6 /10 sore and aching pain toda. The pain is affecting ADLs and limiting desired level of activity. Denies numbness, tingling, radiation and inability to bear weight.    Trial of mobic 15 mg with no improvement. He tried CSI a few years ago with improvement, but he is worried about side effects for continued use.     Occupation: retired    Ambulating: unassisted  Diabetic: no  Smoking: no  Hx of DVT/PE: no    PAST MEDICAL HISTORY:   Past Medical History:   Diagnosis Date    Allergy     Collapse of lung 1971    past history.     GERD (gastroesophageal reflux disease)     Gout attack     Hallux valgus (acquired), unspecified foot     High cholesterol     Hyperlipidemia     Hypertension     Irregular heart rhythm     Knee pain, chronic      PAST SURGICAL HISTORY:   Past Surgical History:   Procedure Laterality Date    chest tube   1971    chest tube placement from collapsed lung     COLONOSCOPY N/A 01/10/2018    Procedure: COLONOSCOPY;  Surgeon: Buck Ferrer MD;  Location: [facility] ENDO;  Service: Endoscopy;  Laterality: N/A;    EPIDURAL STEROID INJECTION INTO CERVICAL SPINE N/A 10/13/2022    Procedure: Injection-steroid-epidural-cervical---C7-T1;  Surgeon: Delfin Cervantes Jr., MD;  Location: [facility];  Service: Pain Management;  Laterality: N/A;    EPIDURAL STEROID INJECTION INTO CERVICAL SPINE N/A 11/10/2022    Procedure: Injection-steroid-epidural-cervical---C7-T1;  Surgeon: Delfin Cervantes Jr., MD;  Location: [facility];  Service: Pain Management;  Laterality: N/A;    ESOPHAGOGASTRODUODENOSCOPY N/A 03/25/2019     Procedure: EGD (ESOPHAGOGASTRODUODENOSCOPY);  Surgeon: Buck Ferrer MD;  Location: [facility];  Service: Endoscopy;  Laterality: N/A;    Injection-steroid-epidural-cervical---C7-T1 (N/A)  10/13/2022     FAMILY HISTORY:   Family History   Problem Relation Name Age of Onset    Cancer Sister x5         ovarian     SOCIAL HISTORY:   Social History     Occupational History    Occupation: retired from school board    Tobacco Use    Smoking status: Never    Smokeless tobacco: Never   Substance and Sexual Activity    Alcohol use: Yes     Alcohol/week: 1.0 standard drink of alcohol     Types: 1 Cans of beer per week     Comment: six pack per day & more over the weekend     Drug use: No    Sexual activity: Yes     Partners: Female     Birth control/protection: None        MEDICATIONS: Current Medications[1]  ALLERGIES: Review of patient's allergies indicates:  No Known Allergies      Physical Exam     Vitals:    06/25/25 1005   BP: 135/87   Pulse: 67     Alert and oriented to person, place and time. No acute distress. Well-groomed, not ill appearing. Pupils round and reactive, normal respiratory effort, no audible wheezing.     GENERAL:  A well-developed, well-nourished 74 y.o. male who is alert and       oriented in no acute distress.      Gait: He  walks with a normal gait.                   EXTREMITIES:  Examination of lower extremities reveals there is no visible mass or deformity.    Left knee:  ROM 0-120    Ligamentously stable to varus/valgus stress.    Anterior and posterior drawers negative.    No pain over pes bursa.    No warmth    No erythema     Effusion No    medial joint line tenderness    Negative Patellofemoral grind/crepitus     The skin over both lower extremities is normal and unremarkable.  He has a  painless range of motion of the hips and ankles bilaterally.   Sensation is intact in both lower extremities.    There are no motor deficits in the lower extremities bilaterally.   Pedal pulses are palpable  distally bilaterally.    He has no calf tenderness to palpation nor edema.    Imaging:     3 view  left Knee X-rays ordered/reviewed by me showing no evidence of fracture or dislocation. There is no obvious malalignment. No evidence of masses, lesions or foreign bodies.  Severe medial compartment narrowing and tricompartmental osteoarthritis.  Kellgren Albert grade 3-4    Assessment & Plan    Primary osteoarthritis of left knee         I made the decision to obtain old records of the patient including previous notes and imaging. New imaging was ordered today of the extremity or extremities evaluated. I independently reviewed and interpreted the radiographs and/or MRIs/CT scan today as well as prior imaging.    We discussed at length different treatment options including conservative vs surgical management. These include anti-inflammatories, acetaminophen, rest, ice, heat, formal physical therapy including strengthening and stretching exercises, home exercise programs, injections, dry needling, and finally surgical intervention.      Patient here for chronic left knee pain.  We discussed his x-rays which show severe medial compartment narrowing.  We discussed treatment options.  He is not interested in knee replacement at this time.  He would like to trial viscosupplementation.  We also discussed genicular nerve blocks which he would like to learn more about.    Medical Necessity for viscosupplementation use: After thorough evaluation of the patient, I have determined that viscosupplementation treatment is medically necessary. The patient has painful degenerative joint disease (DJD) of the knee(s) with failure of conservative treatments including lifestyle modifications and rehabilitation exercises. Oral analgesics including NSAIDs have not adequately controlled the patient's symptoms. There is radiographic evidence of Kellgren-Albert grade II (or greater) osteoarthritic (OA) changes, or if lack of radiographic  evidence, there is arthroscopic or other evidence of chondrosis of the knee(s).     Pre-authorization placed for left Euflexxa series injections.  Ice compress to the affected area 2-3x a day for 15-20 minutes as needed for pain management  NSAIDs PRN for pain management.   He is also interested in genicular nerve blocks.  Referral to pain management to discuss this  RTC to see Nora real PA-C for Euflexxa series injections.      Follow up: Euflexxa left  X-rays next visit: none    All questions were answered and patient is agreeable to the above plan.                    [1]   Current Outpatient Medications:     allopurinoL (ZYLOPRIM) 100 MG tablet, Take 1 tablet (100 mg total) by mouth once daily., Disp: 90 tablet, Rfl: 0    amLODIPine (NORVASC) 10 MG tablet, Take 1 tablet (10 mg total) by mouth once daily., Disp: 90 tablet, Rfl: 0    amoxicillin-clavulanate 500-125mg (AUGMENTIN) 500-125 mg Tab, Take 1 tablet (500 mg total) by mouth 2 (two) times daily., Disp: 10 tablet, Rfl: 0    camphor-menthoL 0.2-3.5 % Gel, Apply topically., Disp: , Rfl:     clotrimazole-betamethasone 1-0.05% (LOTRISONE) cream, APPLY EXTERNALLY TO THE AFFECTED AND SURROUNDING AREAS TWICE DAILY IN THE MORNING AND IN THE EVENING FOR 2 WEEKS, Disp: 15 g, Rfl: 1    diclofenac sodium (VOLTAREN ARTHRITIS PAIN) 1 % Gel, Apply 2 g topically once daily., Disp: , Rfl:     ergocalciferol (VITAMIN D2) 50,000 unit Cap, Take 1 capsule (50,000 Units total) by mouth every 30 days., Disp: 12 capsule, Rfl: 0    lisinopriL 10 MG tablet, Take 1 tablet (10 mg total) by mouth once daily., Disp: 90 tablet, Rfl: 0    nystatin-triamcinolone (MYCOLOG II) cream, Apply topically every evening., Disp: 15 g, Rfl: 0    pantoprazole (PROTONIX) 40 MG tablet, Take 1 tablet (40 mg total) by mouth once daily., Disp: 90 tablet, Rfl: 0    simvastatin (ZOCOR) 20 MG tablet, Take 1 tablet (20 mg total) by mouth every evening., Disp: 90 tablet, Rfl: 0  No current  facility-administered medications for this visit.    Facility-Administered Medications Ordered in Other Visits:     ALPRAZolam dissolvable tablet 1 mg, 1 mg, Oral, Once PRN, Delfin Cervantes Jr., MD    dexAMETHasone sodium phos (PF) injection 10 mg, 10 mg, Epidural, Once, Delfin Cervantes Jr., MD    iohexoL (OMNIPAQUE 240) injection 5 mL, 5 mL, Intravenous, ONCE PRN, Delfin Cervantes Jr., MD    LIDOcaine (PF) 10 mg/ml (1%) injection 10 mg, 1 mL, Other, Once, Delfin Cervantes Jr., MD    LIDOcaine (PF) 20 mg/mL (2%) injection 200 mg, 10 mL, Other, Once, Delfin Cervantes Jr., MD

## 2025-06-26 ENCOUNTER — OFFICE VISIT (OUTPATIENT)
Dept: PAIN MEDICINE | Facility: CLINIC | Age: 75
End: 2025-06-26
Payer: MEDICARE

## 2025-06-26 VITALS
HEART RATE: 78 BPM | BODY MASS INDEX: 36.66 KG/M2 | HEIGHT: 72 IN | WEIGHT: 270.63 LBS | SYSTOLIC BLOOD PRESSURE: 124 MMHG | DIASTOLIC BLOOD PRESSURE: 77 MMHG

## 2025-06-26 DIAGNOSIS — M17.12 PRIMARY OSTEOARTHRITIS OF LEFT KNEE: ICD-10-CM

## 2025-06-26 DIAGNOSIS — G89.29 CHRONIC PAIN OF LEFT KNEE: Primary | ICD-10-CM

## 2025-06-26 DIAGNOSIS — M25.562 CHRONIC PAIN OF LEFT KNEE: Primary | ICD-10-CM

## 2025-06-26 PROCEDURE — 99214 OFFICE O/P EST MOD 30 MIN: CPT | Mod: PBBFAC,PN | Performed by: PAIN MEDICINE

## 2025-06-26 PROCEDURE — 99999 PR PBB SHADOW E&M-EST. PATIENT-LVL IV: CPT | Mod: PBBFAC,,, | Performed by: PAIN MEDICINE

## 2025-06-26 PROCEDURE — 99214 OFFICE O/P EST MOD 30 MIN: CPT | Mod: S$PBB,,, | Performed by: PAIN MEDICINE

## 2025-06-26 NOTE — PATIENT INSTRUCTIONS
Reviewed pre op instructions with patient:    Please leave jewelry and valuables at home or give them to your escort for safe keeping.  You will be required to have an adult to escort you after the procedure is over. Although we will not be sedating you for your procedure we ask for an escort for safety after the procedure.  If you are taking prescribed thinners (Coumadin, warfarin, apixaban, Eliquis, Plavix, clopidogrel, Pletal, etc) we will obtain cardiac clearance from your cardiologist or provider that is monitoring your medications and levels to hold the medications if appropriate.  Cleanse your skin the evening before with an antibacterial soap (Dial or Hibiclens) and then repeat it again the morning of the procedure.  Do not apply lotions, creams, deodorants, perfumes, or colognes the day of the procedure.  You will be contacted the day before the procedure between 12-3p to be given the time to arrive the day of the procedure. If you are not contacted by the afternoon before the procedure you should contact [phone].  Nothing by mouth after midnight before the procedure.    Patient verbalized understanding of the instructions provided to them and clinic contact number was provided for any further questions they may have.

## 2025-06-27 NOTE — PROGRESS NOTES
Subjective:     Patient ID: Ignacio Pelaez Jr. is a 74 y.o. male    Chief Complaint: Knee Pain (left)        Referred by: Nora Paul, *      HPI:    Interval History (6/26/25):  He returns today for follow up.  He reports that he has been having chronic left knee pain.  Is being followed by Orthopedic surgery.  Has undergone intra-articular steroid injections with limited benefit.  He is also concerned about the effects of continued steroid injections into the knee.  He is scheduled for viscosupplementation series in the coming weeks.  He was referred today to discuss genicular nerve blocks/RFA.        Interval History (11/29/22):  He returns today for follow up.  He reports that C7-T1 interlaminar epidural steroid injection has been helpful for the left-sided neck and upper extremity pain.  He reports 100% relief.  He is very happy with these results and does not feel that further treatment is needed at this time.      Interval History (11/1/22):  He returns today for follow up.  He reports that he was evaluated by Dr. Manriquez with Neurosurgery yesterday.  She did not feel that surgery was an appropriate option at this time.  Patient continues to have left-sided neck and upper extremity pain as before without any changes in quality or location..  He denies any new worsening symptoms.        Interval History (10/25/22):  He returns today for follow up.  He reports that C7-T1 interlaminar epidural steroid injection has not been helpful for the left-sided neck pain.  Reports 100% relief but for only 5 days.  Otherwise, denies any changes in the quality or location of his pain.  He denies any new or worsening symptoms.      Interval History (10/4/22):  He returns today for follow up.  He reports that his neck pain has returned. He denies any changes in the quality location the pain.  He denies any new or worsening symptoms.      Interval History (9/6/22):  He returns today for follow up and MRI review.  He  reports that his pain has completely resolved, but he does continue to have some neck stiffness.  Otherwise, he denies any changes in the quality location of his pain.  He does take ibuprofen and gabapentin as needed.  Finds these medications are helpful and well-tolerated.      Interval History (8/25/22):  He returns today for follow up.  He reports that his neck pain is recurring.  Much more severe this time.  Significantly limited range of motion.  Having some pain radiating to the left shoulder but otherwise no pain in the upper extremities.  Pain does radiate inferiorly along the spine.  Denies any new numbness, tingling, weakness, bowel bladder dysfunction.  Gabapentin has not been helpful.  Denies any adverse effects from this medication.       Interval History (3/31/22):  He returns today for follow up.  He reports that he is having acute exacerbation of left-sided neck pain.  This started roughly 5 days ago.  No specific inciting events or injuries noted.  The pain is located left lower cervical paraspinal region left upper trapezius.  The pain did not radiate to left upper extremity.  He denies any associated numbness, tingling, weakness, bowel bladder dysfunction.  He went to the emergency department yesterday and received injection for his pain.  He states this significantly helped his pain.  He is also taking gabapentin with some relief.  He denies any adverse effects this medication.        Interval History (3/3/22):  He returns today for follow up.  He reports that he is doing well.  States that his back pain has mostly resolved since last encounter.  Still has some pain when he over exerts himself but this is fairly mild.  He also reports that his neck pain continues to be resolved.  He does not feel that further treatment is needed at this time.  He does continue to take gabapentin on an as-needed basis finds this is helpful when he is in pain.      Interval History (1/18/22):  He returns today for  follow up.  He reports that physical therapy has been helpful for the neck pain.  He reports his neck pain is essentially resolved.  He continues to attend physical therapy and performs regular home exercise program.  He does state however that he is having acute low back pain.  This pain started less than 1 week ago.  States he might have pulled muscle.  He denies any pain radiating to the lower extremities.  He denies any associated numbness, tingling, weakness, bowel bladder dysfunction.      Initial Encounter (11/23/21):  Ignacio Pelaez Jr. is a 74 y.o. male who presents today with acute neck pain.  This pain started within the past month.  No specific inciting events or injuries noted.  At this time, the pain is mostly resolved but still slightly bothersome.  However the time of onset it was very severe and very limiting.  The pain is located the bilateral lower cervical paraspinal regions.  The pain radiated across the upper back did not radiate into the upper extremities.  He denies any associated numbness, tingling, weakness, bowel bladder dysfunction.  He was seen at the urgent care and given an intramuscular injection and started on meloxicam and gabapentin.  Together, these significantly improved his pain.  He still utilizes gabapentin and meloxicam as needed but fairly infrequently..   This pain is described in detail below.    Physical Therapy:  Yes.    Non-pharmacologic Treatment:  Rest helps         TENS?  No    Pain Medications:         Currently taking:  Voltaren gel    Has tried in the past:  Meloxicam, gabapentin, ibuprofen, Robaxin    Has not tried:  Opioids, Tylenol, TCAs, SNRIs, topical creams    Blood thinners:  None    Interventional Therapies:    10/13/22 - C7-T1 interlaminar epidural steroid injection - 100% relief but for only 5 days  11/10/22 - C7-T1 interlaminar epidural steroid injection - 100% relief    Relevant Surgeries:  None    Affecting sleep?  No    Affecting daily activities?  no    Depressive symptoms? no          SI/HI? No    Work status: Retired    Pain Scores:    Best:       0/10  Worst:     0/10  Usually:   0/10  Today:    0/10    Pain Disability Index  Family/Home Responsibilities:: 0  Recreation:: 5  Social Activity:: 5  Occupation:: 6  Sexual Behavior:: 0  Self Care:: 0  Life-Support Activities:: 4  Pain Disability Index (PDI): 20      Review of Systems   Constitutional:  Negative for activity change, appetite change, chills, fatigue, fever and unexpected weight change.   HENT:  Negative for hearing loss.    Eyes:  Negative for visual disturbance.   Respiratory:  Negative for chest tightness and shortness of breath.    Cardiovascular:  Negative for chest pain.   Gastrointestinal:  Negative for abdominal pain, constipation, diarrhea, nausea and vomiting.   Genitourinary:  Negative for difficulty urinating.   Musculoskeletal:  Positive for arthralgias, gait problem and myalgias. Negative for back pain, neck pain and neck stiffness.   Skin:  Negative for rash.   Neurological:  Negative for dizziness, weakness, light-headedness, numbness and headaches.   Psychiatric/Behavioral:  Negative for hallucinations, sleep disturbance and suicidal ideas. The patient is not nervous/anxious.        Past Medical History:   Diagnosis Date    Allergy     Collapse of lung 1971    past history.     GERD (gastroesophageal reflux disease)     Gout attack     Hallux valgus (acquired), unspecified foot     High cholesterol     Hyperlipidemia     Hypertension     Irregular heart rhythm     Knee pain, chronic        Past Surgical History:   Procedure Laterality Date    chest tube   1971    chest tube placement from collapsed lung     COLONOSCOPY N/A 01/10/2018    Procedure: COLONOSCOPY;  Surgeon: Buck Ferrer MD;  Location: [facility];  Service: Endoscopy;  Laterality: N/A;    EPIDURAL STEROID INJECTION INTO CERVICAL SPINE N/A 10/13/2022    Procedure: Injection-steroid-epidural-cervical---C7-T1;  Surgeon:  Delfin Cervantes Jr., MD;  Location: [facility];  Service: Pain Management;  Laterality: N/A;    EPIDURAL STEROID INJECTION INTO CERVICAL SPINE N/A 11/10/2022    Procedure: Injection-steroid-epidural-cervical---C7-T1;  Surgeon: Delfin Cervantes Jr., MD;  Location: [facility];  Service: Pain Management;  Laterality: N/A;    ESOPHAGOGASTRODUODENOSCOPY N/A 03/25/2019    Procedure: EGD (ESOPHAGOGASTRODUODENOSCOPY);  Surgeon: Buck Ferrer MD;  Location: [facility];  Service: Endoscopy;  Laterality: N/A;    Injection-steroid-epidural-cervical---C7-T1 (N/A)  10/13/2022       Social History     Socioeconomic History    Marital status:     Number of children: 1   Occupational History    Occupation: retired from school board    Tobacco Use    Smoking status: Never    Smokeless tobacco: Never   Substance and Sexual Activity    Alcohol use: Yes     Alcohol/week: 1.0 standard drink of alcohol     Types: 1 Cans of beer per week     Comment: six pack per day & more over the weekend     Drug use: No    Sexual activity: Yes     Partners: Female     Birth control/protection: None       Review of patient's allergies indicates:  No Known Allergies    Current Outpatient Medications on File Prior to Visit   Medication Sig Dispense Refill    allopurinoL (ZYLOPRIM) 100 MG tablet Take 1 tablet (100 mg total) by mouth once daily. 90 tablet 0    amLODIPine (NORVASC) 10 MG tablet Take 1 tablet (10 mg total) by mouth once daily. 90 tablet 0    camphor-menthoL 0.2-3.5 % Gel Apply topically.      clotrimazole-betamethasone 1-0.05% (LOTRISONE) cream APPLY EXTERNALLY TO THE AFFECTED AND SURROUNDING AREAS TWICE DAILY IN THE MORNING AND IN THE EVENING FOR 2 WEEKS 15 g 1    diclofenac sodium (VOLTAREN ARTHRITIS PAIN) 1 % Gel Apply 2 g topically once daily.      ergocalciferol (VITAMIN D2) 50,000 unit Cap Take 1 capsule (50,000 Units total) by mouth every 30 days. 12 capsule 0    lisinopriL 10 MG tablet Take 1 tablet (10 mg  total) by mouth once daily. 90 tablet 0    nystatin-triamcinolone (MYCOLOG II) cream Apply topically every evening. 15 g 0    pantoprazole (PROTONIX) 40 MG tablet Take 1 tablet (40 mg total) by mouth once daily. 90 tablet 0    simvastatin (ZOCOR) 20 MG tablet Take 1 tablet (20 mg total) by mouth every evening. 90 tablet 0    amoxicillin-clavulanate 500-125mg (AUGMENTIN) 500-125 mg Tab Take 1 tablet (500 mg total) by mouth 2 (two) times daily. 10 tablet 0     Current Facility-Administered Medications on File Prior to Visit   Medication Dose Route Frequency Provider Last Rate Last Admin    ALPRAZolam dissolvable tablet 1 mg  1 mg Oral Once PRN Delfin Cervantes Jr., MD        dexAMETHasone sodium phos (PF) injection 10 mg  10 mg Epidural Once Delfin Cervantes Jr., MD        iohexoL (OMNIPAQUE 240) injection 5 mL  5 mL Intravenous ONCE PRN Delfin Cervantes Jr., MD        LIDOcaine (PF) 10 mg/ml (1%) injection 10 mg  1 mL Other Once Delfin Cervantes Jr., MD        LIDOcaine (PF) 20 mg/mL (2%) injection 200 mg  10 mL Other Once Delfni Cervantes Jr., MD           Objective:      /77 (BP Location: Left arm, Patient Position: Sitting)   Pulse 78   Ht 6' (1.829 m)   Wt 122.7 kg (270 lb 9.8 oz)   BMI 36.70 kg/m²     Exam:  GEN:  Well developed, well nourished.  No acute distress.   HEENT:  No trauma.  Mucous membranes moist.  Nares patent bilaterally.  PSYCH: Normal affect. Thought content appropriate.  CHEST:  Breathing symmetric.  No audible wheezing.  ABD: Soft, non-distended.  SKIN:  Warm, pink, dry.  No rash on exposed areas.    EXT:  No cyanosis, clubbing, or edema.  No color change or changes in nail or hair growth.  NEURO/MUSCULOSKELETAL:  Fully alert, oriented, and appropriate. Speech normal milvia. No cranial nerve deficits.   Gait:  Antalgic.  No focal motor deficits.     No gross warmth, swelling, erythema to left knee          Imaging:      Narrative & Impression    EXAMINATION:  XR  KNEE 3 VIEW LEFT     CLINICAL HISTORY:  Pain in left knee     TECHNIQUE:  AP, lateral, and Merchant views of the left knee were performed.     COMPARISON:  Plain film from 08/02/2023     FINDINGS:  Tricompartment degenerative changes with severe joint space narrowing of the medial compartment with prominent osteophyte formation.  Chondrocalcinosis.  No evidence of acute fracture or dislocation.  Suprapatellar joint effusion.     Impression:     As above.        Electronically signed by:Mike Malhotra MD  Date:                                            10/16/2024  Time:                                           10:43         Narrative & Impression    EXAMINATION:  MRI CERVICAL SPINE WITHOUT CONTRAST     CLINICAL HISTORY:  Neck pain, chronic, degenerative changes on xray; Other cervical disc degeneration, unspecified cervical region     TECHNIQUE:  Multiplanar, multisequence MR images of the cervical spine were performed without the administration of contrast.     COMPARISON:  Cervical spine radiograph 03/30/2022     FINDINGS:  C1-C2: Dens is intact.  Pre dens space is maintained.     Alignment: Straightening of the cervical lordosis.  Minimal anterolisthesis C3 on C4.     Vertebrae: No acute fracture or abnormal marrow infiltrative process.  Degenerative endplate changes at C6-C7.     Discs: Multilevel mild-to-moderate disc height loss most significant C6-C7.  Normal signal.     Cord: Normal signal.     Skull base and craniocervical junction: Normal.     Degenerative findings:     C2-C3: Posterior disc osteophyte complex effacing the ventral thecal sac and mild left facet joint arthropathy.  Findings contribute to mild left neural foraminal narrowing.     C3-C4: Posterior disc osteophyte complex abutting or flattening the cord and facet joint arthropathy greater on the left.  Findings contribute to moderate spinal canal stenosis with severe left and moderate right neural foraminal narrowing.     C4-C5: Posterior  disc osteophyte complex contributing to moderate spinal canal stenosis and severe bilateral neural foraminal narrowing.     C5-C6: Posterior disc osteophyte complex and mild facet joint arthropathy contributing to moderate spinal canal stenosis and severe bilateral neural foraminal narrowing.     C6-C7: Posterior disc osteophyte complex with mild facet joint arthropathy contribute to moderate spinal canal stenosis and severe bilateral neural foraminal narrowing.     C7-T1: Posterior disc osteophyte complex contributing to mild bilateral neural foraminal narrowing.     Prominent posterior disc bulges at T1-T2 and T3-T4 contributing to neural foraminal narrowing on the right incompletely evaluated.     Paraspinal muscles & soft tissues: Left thyroid 2.0 cm thyroid nodule.     Impression:     Cervical spondylosis with multilevel moderate spinal canal stenosis and severe neural foraminal narrowing most significant at C3-C4.     2.0 cm left thyroid nodule.  Recommend further evaluation with nonemergent thyroid ultrasound per ACR guidelines.     Electronically signed by resident: Jaylan Garsia  Date:                                            09/01/2022  Time:                                           10:35     Electronically signed by: Benigno Esparza MD  Date:                                            09/01/2022  Time:                                           12:17         Narrative & Impression    EXAMINATION:  XR CERVICAL SPINE COMPLETE 5 VIEW     CLINICAL HISTORY:  . Cervicalgia     TECHNIQUE:  AP, Lateral, bilateral oblique and open mouth views of the cervical spine were performed.     COMPARISON:  06/25/2019     FINDINGS:  Cervical vertebral body heights and alignment are preserved with multilevel spondylitic spurring and disc narrowing at the mid and lower cervical levels.  There is straightening and reversal of the normal cervical lordosis.     Posterior elements and neural foramina intact.     No prevertebral  soft tissue swelling     Impression:     Multilevel spondylosis        Electronically signed by: Buck Young Jr  Date:                                            09/10/2021  Time:                                           10:52         Assessment:       Encounter Diagnoses   Name Primary?    Primary osteoarthritis of left knee     Chronic pain of left knee Yes     Plan:       Ignacio was seen today for knee pain.    Diagnoses and all orders for this visit:    Chronic pain of left knee    Primary osteoarthritis of left knee  -     Ambulatory referral/consult to Pain Clinic      Ignacio Pelaez Jr. is a 74 y.o. male with chronic left knee pain secondary to osteoarthritis.  Has failed intra-articular steroid injections.    Pertinent imaging studies reviewed by me. Imaging results were discussed with patient.  Continue with viscosupplementation injections as per Orthopedic surgery.    Plan to perform left genicular nerve blocks under fluoroscopy x2.  If diagnostic, can proceed with cooled radiofrequency ablations.  We will wait at least 3 weeks after completion of viscosupplementation injections before proceeding with these procedures.  If patient is getting good relief from viscosupplementation injections we do not need to proceed with genicular nerve blocks/RFA.    Return to clinic after procedure to discuss results.    This note was created by combination of typed  and M-Modal dictation. Transcription and phonetic errors may be present.  If there are any questions, please contact me.

## 2025-07-08 ENCOUNTER — OFFICE VISIT (OUTPATIENT)
Dept: ORTHOPEDICS | Facility: CLINIC | Age: 75
End: 2025-07-08
Payer: MEDICARE

## 2025-07-08 VITALS
DIASTOLIC BLOOD PRESSURE: 88 MMHG | WEIGHT: 270.5 LBS | BODY MASS INDEX: 36.64 KG/M2 | SYSTOLIC BLOOD PRESSURE: 135 MMHG | HEIGHT: 72 IN | HEART RATE: 67 BPM

## 2025-07-08 DIAGNOSIS — M17.12 PRIMARY OSTEOARTHRITIS OF LEFT KNEE: Primary | ICD-10-CM

## 2025-07-08 PROCEDURE — 99999PBSHW PR PBB SHADOW TECHNICAL ONLY FILED TO HB: Mod: JZ,PBBFAC,,

## 2025-07-08 PROCEDURE — 99213 OFFICE O/P EST LOW 20 MIN: CPT | Mod: PBBFAC,PN

## 2025-07-08 PROCEDURE — 99999 PR PBB SHADOW E&M-EST. PATIENT-LVL III: CPT | Mod: PBBFAC,,,

## 2025-07-08 PROCEDURE — 20610 DRAIN/INJ JOINT/BURSA W/O US: CPT | Mod: PBBFAC,PN,LT

## 2025-07-08 RX ADMIN — Medication 20 MG: at 11:07

## 2025-07-08 NOTE — PROCEDURES
Large Joint Aspiration/Injection: L knee    Date/Time: 7/8/2025 11:30 AM    Performed by: Nora Paul PA-C  Authorized by: Nora Paul PA-C    Consent Done?:  Yes (Verbal)  Indications:  Pain and arthritis  Site marked: the procedure site was marked    Timeout: prior to procedure the correct patient, procedure, and site was verified    Prep: patient was prepped and draped in usual sterile fashion      Local anesthesia used?: Yes    Local anesthetic:  Topical anesthetic    Details:  Needle Size:  21 G  Ultrasonic Guidance for needle placement?: No    Approach:  Anterolateral  Location:  Knee  Site:  L knee  Medications:  20 mg sodium hyaluronate (EUFLEXXA) 10 mg/mL injection ([place] formul[place] preferred)  Patient tolerance:  Patient tolerated the procedure well with no immediate complications

## 2025-07-08 NOTE — PROGRESS NOTES
Ignacio Pelaez Jr. is here for follow up of left knee arthritis. Pt is requesting Euflexxa series injections #1 of 3.  [facility] Has failed other conservative modalities including NSAIDS, activity modification, weight loss.    The prior shot was tolerated well.    PHYSICAL EXAMINATION:     General: The patient is alert and oriented x 3. Mood is pleasant.   Observation of ears, eyes and nose reveals no gross abnormalities. No   labored breathing observed.     No signs of infection or adverse reaction to knee.    Medical Necessity for viscosupplementation use: After thorough evaluation of the patient, I have determined that viscosupplementation treatment is medically necessary. The patient has painful degenerative joint disease (DJD) of the knee(s) with failure of conservative treatments including lifestyle modifications and rehabilitation exercises. Oral analgesics including NSAIDs have not adequately controlled the patient's symptoms. There is radiographic evidence of Kellgren-Albert grade II (or greater) osteoarthritic (OA) changes, or if lack of radiographic evidence, there is arthroscopic or other evidence of chondrosis of the knee(s).     Injection Procedure  A time out was performed, including verification of patient ID, procedure, site and side, availability of information and equipment, review of safety issues, and agreement with consent, the procedure site was marked.    After time out was performed, the patient was prepped aseptically with chloraprep. A diagnostic and therapeutic injection of 2cc Euflexxa was given under sterile technique using a 21g x 1.5 needle from the anterolateral  aspect of the left Knee Joint in the sitting position.      Ignacio Pelaez Jr. had no adverse reactions to the medication. Pain decreased. He was instructed to apply ice to the joint for 20 minutes and avoid strenuous activities for 24-36 hours following the injection. He was warned of possible  blood sugar and/or blood pressure changes during that time. Following that time, he can resume regular activities.    He was reminded to call the clinic immediately for any adverse side effects as explained in clinic today.    RTC to see Nora Paul PA-C for Euflexxa series injections #2 of 3.    All of the patient's questions were answered and the patient will contact us if they have any questions or concerns in the interim.

## 2025-07-15 ENCOUNTER — OFFICE VISIT (OUTPATIENT)
Dept: ORTHOPEDICS | Facility: CLINIC | Age: 75
End: 2025-07-15
Payer: MEDICARE

## 2025-07-15 VITALS
RESPIRATION RATE: 18 BRPM | BODY MASS INDEX: 37.51 KG/M2 | HEART RATE: 75 BPM | DIASTOLIC BLOOD PRESSURE: 88 MMHG | SYSTOLIC BLOOD PRESSURE: 137 MMHG | WEIGHT: 276.56 LBS

## 2025-07-15 DIAGNOSIS — M17.12 PRIMARY OSTEOARTHRITIS OF LEFT KNEE: Primary | ICD-10-CM

## 2025-07-15 PROCEDURE — 20610 DRAIN/INJ JOINT/BURSA W/O US: CPT | Mod: PBBFAC,PN,LT

## 2025-07-15 PROCEDURE — 99999PBSHW PR PBB SHADOW TECHNICAL ONLY FILED TO HB: Mod: JZ,PBBFAC,,

## 2025-07-15 PROCEDURE — 99213 OFFICE O/P EST LOW 20 MIN: CPT | Mod: PBBFAC,PN,25

## 2025-07-15 PROCEDURE — 99999 PR PBB SHADOW E&M-EST. PATIENT-LVL III: CPT | Mod: PBBFAC,,,

## 2025-07-15 RX ADMIN — Medication 20 MG: at 11:07

## 2025-07-15 NOTE — PROGRESS NOTES
Ignacio Pelaez Jr. is here for follow up of left knee arthritis. Pt is requesting Euflexxa series injections #2 of 3.  [facility] Has failed other conservative modalities including NSAIDS, activity modification, weight loss.    The prior shot was tolerated well.    PHYSICAL EXAMINATION:     General: The patient is alert and oriented x 3. Mood is pleasant.   Observation of ears, eyes and nose reveals no gross abnormalities. No   labored breathing observed.     No signs of infection or adverse reaction to knee.    Medical Necessity for viscosupplementation use: After thorough evaluation of the patient, I have determined that viscosupplementation treatment is medically necessary. The patient has painful degenerative joint disease (DJD) of the knee(s) with failure of conservative treatments including lifestyle modifications and rehabilitation exercises. Oral analgesics including NSAIDs have not adequately controlled the patient's symptoms. There is radiographic evidence of Kellgren-Albert grade II (or greater) osteoarthritic (OA) changes, or if lack of radiographic evidence, there is arthroscopic or other evidence of chondrosis of the knee(s).     Injection Procedure  A time out was performed, including verification of patient ID, procedure, site and side, availability of information and equipment, review of safety issues, and agreement with consent, the procedure site was marked.    After time out was performed, the patient was prepped aseptically with chloraprep. A diagnostic and therapeutic injection of 2cc Euflexxa was given under sterile technique using a 21g x 1.5 needle from the anterolateral  aspect of the left Knee Joint in the sitting position.      Ignacio Pelaez Jr. had no adverse reactions to the medication. Pain decreased. He was instructed to apply ice to the joint for 20 minutes and avoid strenuous activities for 24-36 hours following the injection. He was warned of possible  blood sugar and/or blood pressure changes during that time. Following that time, he can resume regular activities.    He was reminded to call the clinic immediately for any adverse side effects as explained in clinic today.    RTC to see Nora Paul PA-C for Euflexxa series injections #3 of 3.    All of the patient's questions were answered and the patient will contact us if they have any questions or concerns in the interim.

## 2025-07-15 NOTE — PROCEDURES
Large Joint Aspiration/Injection: L knee    Date/Time: 7/15/2025 11:30 AM    Performed by: Nora Paul PA-C  Authorized by: Nora Paul PA-C    Consent Done?:  Yes (Verbal)  Indications:  Pain and arthritis  Site marked: the procedure site was marked    Timeout: prior to procedure the correct patient, procedure, and site was verified    Prep: patient was prepped and draped in usual sterile fashion    Local anesthetic:  Topical anesthetic    Details:  Needle Size:  21 G  Ultrasonic Guidance for needle placement?: No    Approach:  Anterolateral  Location:  Knee  Site:  L knee  Medications:  20 mg sodium hyaluronate (EUFLEXXA) 10 mg/mL injection ([place] formul[place] preferred)  Patient tolerance:  Patient tolerated the procedure well with no immediate complications

## 2025-07-22 ENCOUNTER — OFFICE VISIT (OUTPATIENT)
Dept: ORTHOPEDICS | Facility: CLINIC | Age: 75
End: 2025-07-22
Payer: MEDICARE

## 2025-07-22 VITALS
BODY MASS INDEX: 37.15 KG/M2 | SYSTOLIC BLOOD PRESSURE: 136 MMHG | DIASTOLIC BLOOD PRESSURE: 79 MMHG | HEART RATE: 73 BPM | WEIGHT: 274.25 LBS | HEIGHT: 72 IN

## 2025-07-22 DIAGNOSIS — M17.12 PRIMARY OSTEOARTHRITIS OF LEFT KNEE: Primary | ICD-10-CM

## 2025-07-22 PROCEDURE — 20610 DRAIN/INJ JOINT/BURSA W/O US: CPT | Mod: PBBFAC,PN,LT

## 2025-07-22 PROCEDURE — 99213 OFFICE O/P EST LOW 20 MIN: CPT | Mod: PBBFAC,PN

## 2025-07-22 PROCEDURE — 20610 DRAIN/INJ JOINT/BURSA W/O US: CPT | Mod: S$PBB,LT,,

## 2025-07-22 PROCEDURE — 99999PBSHW PR PBB SHADOW TECHNICAL ONLY FILED TO HB: Mod: JZ,PBBFAC,,

## 2025-07-22 PROCEDURE — 99999 PR PBB SHADOW E&M-EST. PATIENT-LVL III: CPT | Mod: PBBFAC,,,

## 2025-07-22 PROCEDURE — 99499 UNLISTED E&M SERVICE: CPT | Mod: 25,S$PBB,,

## 2025-07-22 RX ADMIN — Medication 20 MG: at 11:07

## 2025-07-22 RX ADMIN — Medication 20 MG: at 12:07

## 2025-07-22 NOTE — PROCEDURES
Large Joint Aspiration/Injection: L knee    Date/Time: 7/22/2025 11:30 AM    Performed by: Nora Paul PA-C  Authorized by: Nora Paul PA-C    Consent Done?:  Yes (Verbal)  Indications:  Pain and arthritis  Site marked: the procedure site was marked    Timeout: prior to procedure the correct patient, procedure, and site was verified    Prep: patient was prepped and draped in usual sterile fashion    Local anesthetic:  Topical anesthetic    Details:  Needle Size:  21 G  Ultrasonic Guidance for needle placement?: No    Approach:  Anterolateral  Location:  Knee  Site:  L knee  Medications:  20 mg sodium hyaluronate (EUFLEXXA) 10 mg/mL injection ([place] formulary preferred)  Patient tolerance:  Patient tolerated the procedure well with no immediate complications

## 2025-07-22 NOTE — PROGRESS NOTES
Ignacio Pelaez Jr. is here for follow up of left knee arthritis. Pt is requesting Euflexxa series injections #3 of 3.  [facility] Has failed other conservative modalities including NSAIDS, activity modification, weight loss.    The prior shot was tolerated well.    PHYSICAL EXAMINATION:     General: The patient is alert and oriented x 3. Mood is pleasant.   Observation of ears, eyes and nose reveals no gross abnormalities. No   labored breathing observed.     No signs of infection or adverse reaction to knee.    Medical Necessity for viscosupplementation use: After thorough evaluation of the patient, I have determined that viscosupplementation treatment is medically necessary. The patient has painful degenerative joint disease (DJD) of the knee(s) with failure of conservative treatments including lifestyle modifications and rehabilitation exercises. Oral analgesics including NSAIDs have not adequately controlled the patient's symptoms. There is radiographic evidence of Kellgren-Albert grade II (or greater) osteoarthritic (OA) changes, or if lack of radiographic evidence, there is arthroscopic or other evidence of chondrosis of the knee(s).     Injection Procedure  A time out was performed, including verification of patient ID, procedure, site and side, availability of information and equipment, review of safety issues, and agreement with consent, the procedure site was marked.    After time out was performed, the patient was prepped aseptically with chloraprep. A diagnostic and therapeutic injection of 2cc Euflexxa was given under sterile technique using a 21g x 1.5 needle from the anterolateral  aspect of the left Knee Joint in the sitting position.      Ignacio Pelaez Jr. had no adverse reactions to the medication. Pain decreased. He was instructed to apply ice to the joint for 20 minutes and avoid strenuous activities for 24-36 hours following the injection. He was warned of possible  blood sugar and/or blood pressure changes during that time. Following that time, he can resume regular activities.    He was reminded to call the clinic immediately for any adverse side effects as explained in clinic today.    RTC to see Nora Paul PA-C in 3 months or prn for follow up    All of the patient's questions were answered and the patient will contact us if they have any questions or concerns in the interim.